# Patient Record
Sex: FEMALE | Race: ASIAN | NOT HISPANIC OR LATINO | URBAN - METROPOLITAN AREA
[De-identification: names, ages, dates, MRNs, and addresses within clinical notes are randomized per-mention and may not be internally consistent; named-entity substitution may affect disease eponyms.]

---

## 2021-06-10 ENCOUNTER — INPATIENT (INPATIENT)
Facility: HOSPITAL | Age: 61
LOS: 6 days | Discharge: HOME | DRG: 339 | End: 2021-06-17
Attending: SURGERY | Admitting: SURGERY
Payer: COMMERCIAL

## 2021-06-10 ENCOUNTER — RESULT REVIEW (OUTPATIENT)
Age: 61
End: 2021-06-10

## 2021-06-10 VITALS
OXYGEN SATURATION: 98 % | WEIGHT: 139.99 LBS | DIASTOLIC BLOOD PRESSURE: 81 MMHG | HEART RATE: 75 BPM | SYSTOLIC BLOOD PRESSURE: 144 MMHG | RESPIRATION RATE: 16 BRPM | TEMPERATURE: 99 F

## 2021-06-10 DIAGNOSIS — Z98.891 HISTORY OF UTERINE SCAR FROM PREVIOUS SURGERY: Chronic | ICD-10-CM

## 2021-06-10 DIAGNOSIS — K37 UNSPECIFIED APPENDICITIS: ICD-10-CM

## 2021-06-10 PROBLEM — Z00.00 ENCOUNTER FOR PREVENTIVE HEALTH EXAMINATION: Status: ACTIVE | Noted: 2021-06-10

## 2021-06-10 LAB
ALBUMIN SERPL ELPH-MCNC: 3 G/DL — LOW (ref 3.3–5)
ALP SERPL-CCNC: 209 U/L — HIGH (ref 40–120)
ALT FLD-CCNC: 111 U/L — HIGH (ref 12–78)
ANION GAP SERPL CALC-SCNC: 10 MMOL/L — SIGNIFICANT CHANGE UP (ref 5–17)
APPEARANCE UR: CLEAR — SIGNIFICANT CHANGE UP
APTT BLD: 35.2 SEC — SIGNIFICANT CHANGE UP (ref 27.5–35.5)
AST SERPL-CCNC: 39 U/L — HIGH (ref 15–37)
BASOPHILS # BLD AUTO: 0.03 K/UL — SIGNIFICANT CHANGE UP (ref 0–0.2)
BASOPHILS NFR BLD AUTO: 0.2 % — SIGNIFICANT CHANGE UP (ref 0–2)
BILIRUB SERPL-MCNC: 1.7 MG/DL — HIGH (ref 0.2–1.2)
BILIRUB UR-MCNC: ABNORMAL
BUN SERPL-MCNC: 9 MG/DL — SIGNIFICANT CHANGE UP (ref 7–23)
CALCIUM SERPL-MCNC: 8.5 MG/DL — SIGNIFICANT CHANGE UP (ref 8.5–10.1)
CHLORIDE SERPL-SCNC: 101 MMOL/L — SIGNIFICANT CHANGE UP (ref 96–108)
CO2 SERPL-SCNC: 24 MMOL/L — SIGNIFICANT CHANGE UP (ref 22–31)
COLOR SPEC: ABNORMAL
CREAT SERPL-MCNC: 0.69 MG/DL — SIGNIFICANT CHANGE UP (ref 0.5–1.3)
DIFF PNL FLD: ABNORMAL
EOSINOPHIL # BLD AUTO: 0.02 K/UL — SIGNIFICANT CHANGE UP (ref 0–0.5)
EOSINOPHIL NFR BLD AUTO: 0.1 % — SIGNIFICANT CHANGE UP (ref 0–6)
GLUCOSE SERPL-MCNC: 128 MG/DL — HIGH (ref 70–99)
GLUCOSE UR QL: NEGATIVE — SIGNIFICANT CHANGE UP
HCT VFR BLD CALC: 38.6 % — SIGNIFICANT CHANGE UP (ref 34.5–45)
HGB BLD-MCNC: 12.9 G/DL — SIGNIFICANT CHANGE UP (ref 11.5–15.5)
IMM GRANULOCYTES NFR BLD AUTO: 0.5 % — SIGNIFICANT CHANGE UP (ref 0–1.5)
INR BLD: 1.31 RATIO — HIGH (ref 0.88–1.16)
KETONES UR-MCNC: ABNORMAL
LEUKOCYTE ESTERASE UR-ACNC: ABNORMAL
LYMPHOCYTES # BLD AUTO: 0.78 K/UL — LOW (ref 1–3.3)
LYMPHOCYTES # BLD AUTO: 3.9 % — LOW (ref 13–44)
MCHC RBC-ENTMCNC: 32.6 PG — SIGNIFICANT CHANGE UP (ref 27–34)
MCHC RBC-ENTMCNC: 33.4 GM/DL — SIGNIFICANT CHANGE UP (ref 32–36)
MCV RBC AUTO: 97.5 FL — SIGNIFICANT CHANGE UP (ref 80–100)
MONOCYTES # BLD AUTO: 1.12 K/UL — HIGH (ref 0–0.9)
MONOCYTES NFR BLD AUTO: 5.7 % — SIGNIFICANT CHANGE UP (ref 2–14)
NEUTROPHILS # BLD AUTO: 17.7 K/UL — HIGH (ref 1.8–7.4)
NEUTROPHILS NFR BLD AUTO: 89.6 % — HIGH (ref 43–77)
NITRITE UR-MCNC: POSITIVE
NRBC # BLD: 0 /100 WBCS — SIGNIFICANT CHANGE UP (ref 0–0)
PH UR: 6 — SIGNIFICANT CHANGE UP (ref 5–8)
PLATELET # BLD AUTO: 337 K/UL — SIGNIFICANT CHANGE UP (ref 150–400)
POTASSIUM SERPL-MCNC: 3.6 MMOL/L — SIGNIFICANT CHANGE UP (ref 3.5–5.3)
POTASSIUM SERPL-SCNC: 3.6 MMOL/L — SIGNIFICANT CHANGE UP (ref 3.5–5.3)
PROT SERPL-MCNC: 8.3 G/DL — SIGNIFICANT CHANGE UP (ref 6–8.3)
PROT UR-MCNC: 30 MG/DL
PROTHROM AB SERPL-ACNC: 15.1 SEC — HIGH (ref 10.6–13.6)
RBC # BLD: 3.96 M/UL — SIGNIFICANT CHANGE UP (ref 3.8–5.2)
RBC # FLD: 12.2 % — SIGNIFICANT CHANGE UP (ref 10.3–14.5)
SARS-COV-2 RNA SPEC QL NAA+PROBE: SIGNIFICANT CHANGE UP
SODIUM SERPL-SCNC: 135 MMOL/L — SIGNIFICANT CHANGE UP (ref 135–145)
SP GR SPEC: 1.01 — SIGNIFICANT CHANGE UP (ref 1.01–1.02)
UROBILINOGEN FLD QL: 8
WBC # BLD: 19.75 K/UL — HIGH (ref 3.8–10.5)
WBC # FLD AUTO: 19.75 K/UL — HIGH (ref 3.8–10.5)

## 2021-06-10 PROCEDURE — 99285 EMERGENCY DEPT VISIT HI MDM: CPT

## 2021-06-10 PROCEDURE — 44970 LAPAROSCOPY APPENDECTOMY: CPT

## 2021-06-10 PROCEDURE — 44970 LAPAROSCOPY APPENDECTOMY: CPT | Mod: AS

## 2021-06-10 PROCEDURE — 88304 TISSUE EXAM BY PATHOLOGIST: CPT | Mod: 26

## 2021-06-10 PROCEDURE — 93010 ELECTROCARDIOGRAM REPORT: CPT

## 2021-06-10 RX ORDER — SODIUM CHLORIDE 9 MG/ML
1000 INJECTION, SOLUTION INTRAVENOUS
Refills: 0 | Status: DISCONTINUED | OUTPATIENT
Start: 2021-06-10 | End: 2021-06-10

## 2021-06-10 RX ORDER — SODIUM CHLORIDE 9 MG/ML
1000 INJECTION, SOLUTION INTRAVENOUS
Refills: 0 | Status: COMPLETED | OUTPATIENT
Start: 2021-06-10 | End: 2021-06-10

## 2021-06-10 RX ORDER — ENOXAPARIN SODIUM 100 MG/ML
40 INJECTION SUBCUTANEOUS DAILY
Refills: 0 | Status: DISCONTINUED | OUTPATIENT
Start: 2021-06-11 | End: 2021-06-17

## 2021-06-10 RX ORDER — PIPERACILLIN AND TAZOBACTAM 4; .5 G/20ML; G/20ML
3.38 INJECTION, POWDER, LYOPHILIZED, FOR SOLUTION INTRAVENOUS EVERY 8 HOURS
Refills: 0 | Status: COMPLETED | OUTPATIENT
Start: 2021-06-11 | End: 2021-06-15

## 2021-06-10 RX ORDER — ONDANSETRON 8 MG/1
4 TABLET, FILM COATED ORAL ONCE
Refills: 0 | Status: DISCONTINUED | OUTPATIENT
Start: 2021-06-10 | End: 2021-06-11

## 2021-06-10 RX ORDER — ACETAMINOPHEN 500 MG
650 TABLET ORAL ONCE
Refills: 0 | Status: COMPLETED | OUTPATIENT
Start: 2021-06-10 | End: 2021-06-10

## 2021-06-10 RX ORDER — HYDROMORPHONE HYDROCHLORIDE 2 MG/ML
0.5 INJECTION INTRAMUSCULAR; INTRAVENOUS; SUBCUTANEOUS ONCE
Refills: 0 | Status: DISCONTINUED | OUTPATIENT
Start: 2021-06-10 | End: 2021-06-10

## 2021-06-10 RX ORDER — HYDROMORPHONE HYDROCHLORIDE 2 MG/ML
0.5 INJECTION INTRAMUSCULAR; INTRAVENOUS; SUBCUTANEOUS EVERY 4 HOURS
Refills: 0 | Status: DISCONTINUED | OUTPATIENT
Start: 2021-06-11 | End: 2021-06-17

## 2021-06-10 RX ORDER — SODIUM CHLORIDE 9 MG/ML
1000 INJECTION INTRAMUSCULAR; INTRAVENOUS; SUBCUTANEOUS ONCE
Refills: 0 | Status: COMPLETED | OUTPATIENT
Start: 2021-06-10 | End: 2021-06-10

## 2021-06-10 RX ORDER — OXYCODONE HYDROCHLORIDE 5 MG/1
5 TABLET ORAL ONCE
Refills: 0 | Status: DISCONTINUED | OUTPATIENT
Start: 2021-06-10 | End: 2021-06-11

## 2021-06-10 RX ORDER — PANTOPRAZOLE SODIUM 20 MG/1
40 TABLET, DELAYED RELEASE ORAL DAILY
Refills: 0 | Status: DISCONTINUED | OUTPATIENT
Start: 2021-06-11 | End: 2021-06-17

## 2021-06-10 RX ORDER — HYDROMORPHONE HYDROCHLORIDE 2 MG/ML
0.5 INJECTION INTRAMUSCULAR; INTRAVENOUS; SUBCUTANEOUS
Refills: 0 | Status: DISCONTINUED | OUTPATIENT
Start: 2021-06-10 | End: 2021-06-11

## 2021-06-10 RX ORDER — ONDANSETRON 8 MG/1
4 TABLET, FILM COATED ORAL EVERY 6 HOURS
Refills: 0 | Status: DISCONTINUED | OUTPATIENT
Start: 2021-06-11 | End: 2021-06-17

## 2021-06-10 RX ORDER — HYDROMORPHONE HYDROCHLORIDE 2 MG/ML
1 INJECTION INTRAMUSCULAR; INTRAVENOUS; SUBCUTANEOUS EVERY 4 HOURS
Refills: 0 | Status: DISCONTINUED | OUTPATIENT
Start: 2021-06-11 | End: 2021-06-17

## 2021-06-10 RX ORDER — PIPERACILLIN AND TAZOBACTAM 4; .5 G/20ML; G/20ML
3.38 INJECTION, POWDER, LYOPHILIZED, FOR SOLUTION INTRAVENOUS ONCE
Refills: 0 | Status: COMPLETED | OUTPATIENT
Start: 2021-06-10 | End: 2021-06-10

## 2021-06-10 RX ORDER — SODIUM CHLORIDE 9 MG/ML
1000 INJECTION, SOLUTION INTRAVENOUS
Refills: 0 | Status: DISCONTINUED | OUTPATIENT
Start: 2021-06-11 | End: 2021-06-11

## 2021-06-10 RX ADMIN — SODIUM CHLORIDE 1000 MILLILITER(S): 9 INJECTION INTRAMUSCULAR; INTRAVENOUS; SUBCUTANEOUS at 18:06

## 2021-06-10 RX ADMIN — SODIUM CHLORIDE 1000 MILLILITER(S): 9 INJECTION INTRAMUSCULAR; INTRAVENOUS; SUBCUTANEOUS at 17:04

## 2021-06-10 RX ADMIN — SODIUM CHLORIDE 1000 MILLILITER(S): 9 INJECTION INTRAMUSCULAR; INTRAVENOUS; SUBCUTANEOUS at 17:06

## 2021-06-10 RX ADMIN — Medication 650 MILLIGRAM(S): at 19:27

## 2021-06-10 RX ADMIN — HYDROMORPHONE HYDROCHLORIDE 0.5 MILLIGRAM(S): 2 INJECTION INTRAMUSCULAR; INTRAVENOUS; SUBCUTANEOUS at 16:20

## 2021-06-10 RX ADMIN — PIPERACILLIN AND TAZOBACTAM 200 GRAM(S): 4; .5 INJECTION, POWDER, LYOPHILIZED, FOR SOLUTION INTRAVENOUS at 18:38

## 2021-06-10 RX ADMIN — HYDROMORPHONE HYDROCHLORIDE 0.5 MILLIGRAM(S): 2 INJECTION INTRAMUSCULAR; INTRAVENOUS; SUBCUTANEOUS at 23:17

## 2021-06-10 RX ADMIN — HYDROMORPHONE HYDROCHLORIDE 0.5 MILLIGRAM(S): 2 INJECTION INTRAMUSCULAR; INTRAVENOUS; SUBCUTANEOUS at 23:02

## 2021-06-10 RX ADMIN — SODIUM CHLORIDE 1000 MILLILITER(S): 9 INJECTION INTRAMUSCULAR; INTRAVENOUS; SUBCUTANEOUS at 16:00

## 2021-06-10 RX ADMIN — HYDROMORPHONE HYDROCHLORIDE 0.5 MILLIGRAM(S): 2 INJECTION INTRAMUSCULAR; INTRAVENOUS; SUBCUTANEOUS at 16:07

## 2021-06-10 RX ADMIN — SODIUM CHLORIDE 250 MILLILITER(S): 9 INJECTION, SOLUTION INTRAVENOUS at 22:42

## 2021-06-10 NOTE — ED PROVIDER NOTE - ATTENDING CONTRIBUTION TO CARE
60 yo female who presents to the ED with a cc of abdominal pain. Pt with no significant past medical history. Reports that for the last week she has been experiencing abdominal pain. Initially the pain began epigastric but has since migrated to the RLQ. Pt reports associated chills. Denies fever, N/V/D/C, CP, SOB, ext numbness or weakness. Pt reports that she followed with her PMD today and was sent for an outpatient CT scan which relieved acute appendicitis. Pt was sent to the ED for further work up. Last po intake 11 am. Pt has been fully vaccinated with Pfizer. On exam pt lying in bed appears uncomfortable, NCAT, PERRL, EOMI, heart RR, lungs CTA, abd soft with TTP to RLQ, suprapubic and LLQ with rebound and guarding +BS noted. Pt presenting with abdominal pain and reported appendicitis on outpatient imaging. Will obtain screening pre op labs, obtain outside report, consult with surgery, medicate for pain and monitor

## 2021-06-10 NOTE — ED PROVIDER NOTE - CLINICAL SUMMARY MEDICAL DECISION MAKING FREE TEXT BOX
presents today due to abdominal pain x 1 week. pt reports she saw Dr. Nicole in which had CT about an hour ago in which noted appendicitis. Reports Dr. Nicole discussed with Dr. Vela. pt admits to hematuria. plan includes labs, preop, admission

## 2021-06-10 NOTE — ED PROVIDER NOTE - OBJECTIVE STATEMENT
62 y/o female without reported PMHx presents today due to abdominal pain x 1 week. pt reports she saw Dr. Nicole in which had CT about an hour ago in which noted appendicitis. Reports Dr. Nicole discussed with Dr. Vela. pt describes pain as aching, non-radiating, and currently 8/10. pt admits to hematuria. pt denies fever, vomiting, dysuria, abdominal PSHx, or any other complaints.

## 2021-06-10 NOTE — H&P ADULT - HISTORY OF PRESENT ILLNESS
61 year old oriental female who presented to her GI doctor c/o severe abdominal pain. Pt states her pain started one week ago and was diffuse at first and later moved to the lower abdomen. She had temps, without nausea or vomiting. She denies any changes in her bowel habits..

## 2021-06-10 NOTE — PATIENT PROFILE ADULT - HAS THE PATIENT USED TOBACCO IN THE PAST 30 DAYS?
No
Pain not relieved by Medications/Numbness, color, or temperature change to extremity/Persistent Nausea and Vomiting/Unable to Urinate/Bleeding that does not stop/Swelling that continues/Fever greater than 101

## 2021-06-10 NOTE — ED ADULT NURSE NOTE - OBJECTIVE STATEMENT
Received the patient in the Er. Patient is alert and oriented. Skin warm AND DRY.  c/o abdominal pain. Abdomen soft and tender. Patient had CT scan out side. Patient will be going to OR. All jeweller and watch taken by .

## 2021-06-10 NOTE — ED PROVIDER NOTE - PHYSICAL EXAMINATION

## 2021-06-10 NOTE — ED ADULT NURSE NOTE - NSIMPLEMENTINTERV_GEN_ALL_ED
Implemented All Universal Safety Interventions:  Ronald to call system. Call bell, personal items and telephone within reach. Instruct patient to call for assistance. Room bathroom lighting operational. Non-slip footwear when patient is off stretcher. Physically safe environment: no spills, clutter or unnecessary equipment. Stretcher in lowest position, wheels locked, appropriate side rails in place.

## 2021-06-10 NOTE — BRIEF OPERATIVE NOTE - NSICDXBRIEFPOSTOP_GEN_ALL_CORE_FT
POST-OP DIAGNOSIS:  Perforated appendicitis 10-Virgil-2021 22:20:46 WITH GENERALIZED PERITONITIS, RLQ ABSCESS Austin Pritchard

## 2021-06-10 NOTE — H&P ADULT - ASSESSMENT
pt had an out pt ct scan of the abdomen and pelvis that was read as perforated appendicitis.  PLAN start IV antibiotics           to OR>

## 2021-06-10 NOTE — ED PROVIDER NOTE - PROGRESS NOTE DETAILS
Discussed with Dr. Vela, advised pre op labs. reports they are obtaining CT results. Advised they will provide abx in OR and to hold for now. Discussed with Dr. rTejo, accepting admission. Discussed results. Dr. Vela requested I apply Elion

## 2021-06-11 DIAGNOSIS — K37 UNSPECIFIED APPENDICITIS: ICD-10-CM

## 2021-06-11 LAB
ALBUMIN SERPL ELPH-MCNC: 2.1 G/DL — LOW (ref 3.3–5)
ALP SERPL-CCNC: 150 U/L — HIGH (ref 40–120)
ALT FLD-CCNC: 78 U/L — SIGNIFICANT CHANGE UP (ref 12–78)
ANION GAP SERPL CALC-SCNC: 8 MMOL/L — SIGNIFICANT CHANGE UP (ref 5–17)
AST SERPL-CCNC: 27 U/L — SIGNIFICANT CHANGE UP (ref 15–37)
BASOPHILS # BLD AUTO: 0.02 K/UL — SIGNIFICANT CHANGE UP (ref 0–0.2)
BASOPHILS NFR BLD AUTO: 0.1 % — SIGNIFICANT CHANGE UP (ref 0–2)
BILIRUB SERPL-MCNC: 2.1 MG/DL — HIGH (ref 0.2–1.2)
BUN SERPL-MCNC: 7 MG/DL — SIGNIFICANT CHANGE UP (ref 7–23)
CALCIUM SERPL-MCNC: 7.8 MG/DL — LOW (ref 8.5–10.1)
CHLORIDE SERPL-SCNC: 108 MMOL/L — SIGNIFICANT CHANGE UP (ref 96–108)
CO2 SERPL-SCNC: 24 MMOL/L — SIGNIFICANT CHANGE UP (ref 22–31)
COVID-19 SPIKE DOMAIN AB INTERP: POSITIVE
COVID-19 SPIKE DOMAIN ANTIBODY RESULT: 146 U/ML — HIGH
CREAT SERPL-MCNC: 0.53 MG/DL — SIGNIFICANT CHANGE UP (ref 0.5–1.3)
EOSINOPHIL # BLD AUTO: 0 K/UL — SIGNIFICANT CHANGE UP (ref 0–0.5)
EOSINOPHIL NFR BLD AUTO: 0 % — SIGNIFICANT CHANGE UP (ref 0–6)
GLUCOSE SERPL-MCNC: 148 MG/DL — HIGH (ref 70–99)
GRAM STN FLD: SIGNIFICANT CHANGE UP
HCT VFR BLD CALC: 32.4 % — LOW (ref 34.5–45)
HCV AB S/CO SERPL IA: 0.27 S/CO — SIGNIFICANT CHANGE UP (ref 0–0.99)
HCV AB SERPL-IMP: SIGNIFICANT CHANGE UP
HGB BLD-MCNC: 10.6 G/DL — LOW (ref 11.5–15.5)
IMM GRANULOCYTES NFR BLD AUTO: 0.6 % — SIGNIFICANT CHANGE UP (ref 0–1.5)
LYMPHOCYTES # BLD AUTO: 0.61 K/UL — LOW (ref 1–3.3)
LYMPHOCYTES # BLD AUTO: 3.7 % — LOW (ref 13–44)
MCHC RBC-ENTMCNC: 32.1 PG — SIGNIFICANT CHANGE UP (ref 27–34)
MCHC RBC-ENTMCNC: 32.7 GM/DL — SIGNIFICANT CHANGE UP (ref 32–36)
MCV RBC AUTO: 98.2 FL — SIGNIFICANT CHANGE UP (ref 80–100)
MONOCYTES # BLD AUTO: 0.42 K/UL — SIGNIFICANT CHANGE UP (ref 0–0.9)
MONOCYTES NFR BLD AUTO: 2.6 % — SIGNIFICANT CHANGE UP (ref 2–14)
NEUTROPHILS # BLD AUTO: 15.19 K/UL — HIGH (ref 1.8–7.4)
NEUTROPHILS NFR BLD AUTO: 93 % — HIGH (ref 43–77)
NRBC # BLD: 0 /100 WBCS — SIGNIFICANT CHANGE UP (ref 0–0)
PLATELET # BLD AUTO: 271 K/UL — SIGNIFICANT CHANGE UP (ref 150–400)
POTASSIUM SERPL-MCNC: 3.9 MMOL/L — SIGNIFICANT CHANGE UP (ref 3.5–5.3)
POTASSIUM SERPL-SCNC: 3.9 MMOL/L — SIGNIFICANT CHANGE UP (ref 3.5–5.3)
PROT SERPL-MCNC: 6.4 G/DL — SIGNIFICANT CHANGE UP (ref 6–8.3)
RBC # BLD: 3.3 M/UL — LOW (ref 3.8–5.2)
RBC # FLD: 12.6 % — SIGNIFICANT CHANGE UP (ref 10.3–14.5)
SARS-COV-2 IGG+IGM SERPL QL IA: 146 U/ML — HIGH
SARS-COV-2 IGG+IGM SERPL QL IA: POSITIVE
SODIUM SERPL-SCNC: 140 MMOL/L — SIGNIFICANT CHANGE UP (ref 135–145)
SPECIMEN SOURCE: SIGNIFICANT CHANGE UP
WBC # BLD: 16.33 K/UL — HIGH (ref 3.8–10.5)
WBC # FLD AUTO: 16.33 K/UL — HIGH (ref 3.8–10.5)

## 2021-06-11 RX ORDER — SODIUM CHLORIDE 9 MG/ML
1000 INJECTION INTRAMUSCULAR; INTRAVENOUS; SUBCUTANEOUS
Refills: 0 | Status: DISCONTINUED | OUTPATIENT
Start: 2021-06-11 | End: 2021-06-13

## 2021-06-11 RX ORDER — ACETAMINOPHEN 500 MG
1000 TABLET ORAL ONCE
Refills: 0 | Status: COMPLETED | OUTPATIENT
Start: 2021-06-11 | End: 2021-06-11

## 2021-06-11 RX ORDER — ACETAMINOPHEN 500 MG
650 TABLET ORAL ONCE
Refills: 0 | Status: COMPLETED | OUTPATIENT
Start: 2021-06-11 | End: 2021-06-11

## 2021-06-11 RX ORDER — OMEPRAZOLE 10 MG/1
1 CAPSULE, DELAYED RELEASE ORAL
Qty: 0 | Refills: 0 | DISCHARGE

## 2021-06-11 RX ADMIN — Medication 1000 MILLIGRAM(S): at 05:38

## 2021-06-11 RX ADMIN — ENOXAPARIN SODIUM 40 MILLIGRAM(S): 100 INJECTION SUBCUTANEOUS at 11:21

## 2021-06-11 RX ADMIN — SODIUM CHLORIDE 75 MILLILITER(S): 9 INJECTION INTRAMUSCULAR; INTRAVENOUS; SUBCUTANEOUS at 16:12

## 2021-06-11 RX ADMIN — PIPERACILLIN AND TAZOBACTAM 25 GRAM(S): 4; .5 INJECTION, POWDER, LYOPHILIZED, FOR SOLUTION INTRAVENOUS at 02:24

## 2021-06-11 RX ADMIN — Medication 650 MILLIGRAM(S): at 17:56

## 2021-06-11 RX ADMIN — PIPERACILLIN AND TAZOBACTAM 25 GRAM(S): 4; .5 INJECTION, POWDER, LYOPHILIZED, FOR SOLUTION INTRAVENOUS at 17:49

## 2021-06-11 RX ADMIN — Medication 400 MILLIGRAM(S): at 05:01

## 2021-06-11 RX ADMIN — PIPERACILLIN AND TAZOBACTAM 25 GRAM(S): 4; .5 INJECTION, POWDER, LYOPHILIZED, FOR SOLUTION INTRAVENOUS at 11:21

## 2021-06-11 RX ADMIN — Medication 650 MILLIGRAM(S): at 18:59

## 2021-06-11 RX ADMIN — PANTOPRAZOLE SODIUM 40 MILLIGRAM(S): 20 TABLET, DELAYED RELEASE ORAL at 11:21

## 2021-06-11 NOTE — PROGRESS NOTE ADULT - ASSESSMENT
IMPRESSION pt improving  PLAN d/c NGT and start on clears           d/c carrillo           encourage OOB

## 2021-06-12 ENCOUNTER — TRANSCRIPTION ENCOUNTER (OUTPATIENT)
Age: 61
End: 2021-06-12

## 2021-06-12 LAB
-  AMIKACIN: SIGNIFICANT CHANGE UP
-  AMOXICILLIN/CLAVULANIC ACID: SIGNIFICANT CHANGE UP
-  AMPICILLIN/SULBACTAM: SIGNIFICANT CHANGE UP
-  AMPICILLIN: SIGNIFICANT CHANGE UP
-  AZTREONAM: SIGNIFICANT CHANGE UP
-  CEFAZOLIN: SIGNIFICANT CHANGE UP
-  CEFEPIME: SIGNIFICANT CHANGE UP
-  CEFOXITIN: SIGNIFICANT CHANGE UP
-  CEFTRIAXONE: SIGNIFICANT CHANGE UP
-  CIPROFLOXACIN: SIGNIFICANT CHANGE UP
-  ERTAPENEM: SIGNIFICANT CHANGE UP
-  GENTAMICIN: SIGNIFICANT CHANGE UP
-  IMIPENEM: SIGNIFICANT CHANGE UP
-  LEVOFLOXACIN: SIGNIFICANT CHANGE UP
-  MEROPENEM: SIGNIFICANT CHANGE UP
-  PIPERACILLIN/TAZOBACTAM: SIGNIFICANT CHANGE UP
-  TOBRAMYCIN: SIGNIFICANT CHANGE UP
-  TRIMETHOPRIM/SULFAMETHOXAZOLE: SIGNIFICANT CHANGE UP
ANION GAP SERPL CALC-SCNC: 7 MMOL/L — SIGNIFICANT CHANGE UP (ref 5–17)
BUN SERPL-MCNC: 11 MG/DL — SIGNIFICANT CHANGE UP (ref 7–23)
CALCIUM SERPL-MCNC: 8.1 MG/DL — LOW (ref 8.5–10.1)
CHLORIDE SERPL-SCNC: 109 MMOL/L — HIGH (ref 96–108)
CO2 SERPL-SCNC: 26 MMOL/L — SIGNIFICANT CHANGE UP (ref 22–31)
CREAT SERPL-MCNC: 0.61 MG/DL — SIGNIFICANT CHANGE UP (ref 0.5–1.3)
GLUCOSE SERPL-MCNC: 160 MG/DL — HIGH (ref 70–99)
HCT VFR BLD CALC: 37.2 % — SIGNIFICANT CHANGE UP (ref 34.5–45)
HGB BLD-MCNC: 12.3 G/DL — SIGNIFICANT CHANGE UP (ref 11.5–15.5)
MCHC RBC-ENTMCNC: 31.9 PG — SIGNIFICANT CHANGE UP (ref 27–34)
MCHC RBC-ENTMCNC: 33.1 GM/DL — SIGNIFICANT CHANGE UP (ref 32–36)
MCV RBC AUTO: 96.6 FL — SIGNIFICANT CHANGE UP (ref 80–100)
METHOD TYPE: SIGNIFICANT CHANGE UP
NRBC # BLD: 0 /100 WBCS — SIGNIFICANT CHANGE UP (ref 0–0)
PLATELET # BLD AUTO: 391 K/UL — SIGNIFICANT CHANGE UP (ref 150–400)
POTASSIUM SERPL-MCNC: 3.5 MMOL/L — SIGNIFICANT CHANGE UP (ref 3.5–5.3)
POTASSIUM SERPL-SCNC: 3.5 MMOL/L — SIGNIFICANT CHANGE UP (ref 3.5–5.3)
RBC # BLD: 3.85 M/UL — SIGNIFICANT CHANGE UP (ref 3.8–5.2)
RBC # FLD: 12.8 % — SIGNIFICANT CHANGE UP (ref 10.3–14.5)
SODIUM SERPL-SCNC: 142 MMOL/L — SIGNIFICANT CHANGE UP (ref 135–145)
WBC # BLD: 20.61 K/UL — HIGH (ref 3.8–10.5)
WBC # FLD AUTO: 20.61 K/UL — HIGH (ref 3.8–10.5)

## 2021-06-12 PROCEDURE — 99222 1ST HOSP IP/OBS MODERATE 55: CPT

## 2021-06-12 RX ADMIN — PANTOPRAZOLE SODIUM 40 MILLIGRAM(S): 20 TABLET, DELAYED RELEASE ORAL at 11:11

## 2021-06-12 RX ADMIN — HYDROMORPHONE HYDROCHLORIDE 1 MILLIGRAM(S): 2 INJECTION INTRAMUSCULAR; INTRAVENOUS; SUBCUTANEOUS at 23:00

## 2021-06-12 RX ADMIN — PIPERACILLIN AND TAZOBACTAM 25 GRAM(S): 4; .5 INJECTION, POWDER, LYOPHILIZED, FOR SOLUTION INTRAVENOUS at 18:12

## 2021-06-12 RX ADMIN — PIPERACILLIN AND TAZOBACTAM 25 GRAM(S): 4; .5 INJECTION, POWDER, LYOPHILIZED, FOR SOLUTION INTRAVENOUS at 03:18

## 2021-06-12 RX ADMIN — HYDROMORPHONE HYDROCHLORIDE 1 MILLIGRAM(S): 2 INJECTION INTRAMUSCULAR; INTRAVENOUS; SUBCUTANEOUS at 22:38

## 2021-06-12 RX ADMIN — SODIUM CHLORIDE 75 MILLILITER(S): 9 INJECTION INTRAMUSCULAR; INTRAVENOUS; SUBCUTANEOUS at 20:00

## 2021-06-12 RX ADMIN — ENOXAPARIN SODIUM 40 MILLIGRAM(S): 100 INJECTION SUBCUTANEOUS at 11:11

## 2021-06-12 RX ADMIN — PIPERACILLIN AND TAZOBACTAM 25 GRAM(S): 4; .5 INJECTION, POWDER, LYOPHILIZED, FOR SOLUTION INTRAVENOUS at 10:23

## 2021-06-12 NOTE — DISCHARGE NOTE PROVIDER - NSDCCPCAREPLAN_GEN_ALL_CORE_FT
PRINCIPAL DISCHARGE DIAGNOSIS  Diagnosis: Appendicitis  Assessment and Plan of Treatment:       SECONDARY DISCHARGE DIAGNOSES  Diagnosis: Liver function abnormality  Assessment and Plan of Treatment:

## 2021-06-12 NOTE — DISCHARGE NOTE PROVIDER - HOSPITAL COURSE
61 year old oriental female who presented to her GI doctor c/o severe abdominal pain. Pt states her pain started one week ago and was diffuse at first and later moved to the lower abdomen. She had temps, without nausea or vomiting. She denies any changes in her bowel habits. CT revealed perforated appendicitis. Pt was made NPO and scheduled for OR.    Hospital course:    Patient underwent successful laparoscopic appendectomy without complications, appendix noted to be perforated, was sent to PACU then to the floor for monitoring.      Over then next few days patient was continued on antibiotics, given pain control. ID consulted POD 2 for increase in white count. Diet was advanced appropriately until return of normal GI function was obtained as evidence by passing of flatus and BM in addition to toleration of diet. Lab values were monitored.    Disposition: Patient to follow-up with Dr. Vela as outpatient in 1 week.           61 year old oriental female who presented to her GI doctor c/o severe abdominal pain. Pt states her pain started one week ago and was diffuse at first and later moved to the lower abdomen. She had temps, without nausea or vomiting. She denies any changes in her bowel habits. CT revealed perforated appendicitis. Pt was made NPO and scheduled for OR.    Hospital course:    Patient underwent successful laparoscopic appendectomy without complications, appendix noted to be perforated, was sent to PACU then to the floor for monitoring.      Over then next few days patient was continued on antibiotics, given pain control. ID consulted POD 2 for increase in white count. Diet was advanced appropriately until return of normal GI function was obtained as evidence by passing of flatus and BM in addition to toleration of diet. Lab values were monitored. Pt had a persistently elevated WBC and therefore a  repeat ct scan was done. It showed small fluid collections  without any discrete abscess. Pt is being sent home on oral antibiotics and plans to repeat the ct scan as an out pt.    Disposition: Patient to follow-up with Dr. Vela as outpatient in 1 week.

## 2021-06-12 NOTE — DISCHARGE NOTE PROVIDER - NSDCMRMEDTOKEN_GEN_ALL_CORE_FT
Motrin  mg oral tablet: 1 tab(s) orally once a day, As Needed  omeprazole 40 mg oral delayed release capsule: 1 cap(s) orally once a day   cefpodoxime 200 mg oral tablet: 1 tab(s) orally every 12 hours  metroNIDAZOLE 500 mg oral tablet: 1 tab(s) orally every 8 hours  omeprazole 40 mg oral delayed release capsule: 1 cap(s) orally once a day

## 2021-06-12 NOTE — CONSULT NOTE ADULT - SUBJECTIVE AND OBJECTIVE BOX
[facility]  INFECTIOUS DISEASES   [address] [place] [address]  Tel: [phone]     Fax: [phone]  =======================================================    MRN-[id number]  EDD BUSH     CC: abdominal pain     HPI:  62 yo woman with no significant PMH was admitted with abdominal pain and had fever, nausea and vomiting but no diarrhea. Reportedly imaginag showed perforated Appendicitis with RLQ abscess, so went to OR on 6/10 night and had appendectomy.  Now has a drain with high serosanguinous out put. No fever.     PAST MEDICAL & SURGICAL HISTORY:  No pertinent past medical history  H/O  section    Social Hx: no smoking, ETOH or drugs     FAMILY HISTORY: noncontributory     Allergies  No Known Allergies    Antibiotics:  piperacillin/tazobactam IVPB.. 3.375 Gram(s) IV Intermittent every 8 hours    REVIEW OF SYSTEMS:  CONSTITUTIONAL:  No Fever or chills  HEENT:  No diplopia or blurred vision.  No sore throat or runny nose.  CARDIOVASCULAR:  No chest pain or SOB.  RESPIRATORY:  No cough, shortness of breath, PND or orthopnea.  GASTROINTESTINAL:  No nausea, vomiting or diarrhea. + abdominal pain and bloating   GENITOURINARY:  No dysuria, frequency or urgency. No Blood in urine  MUSCULOSKELETAL:  no joint aches, no muscle pain  SKIN:  No change in skin, hair or nails.  NEUROLOGIC:  No paresthesias, fasciculations, seizures or weakness.  PSYCHIATRIC:  No disorder of thought or mood.  ENDOCRINE:  No heat or cold intolerance, polyuria or polydipsia.  HEMATOLOGICAL:  No easy bruising or bleeding.     Physical Exam:  Vital Signs Last 24 Hrs  T(C): 36.5 (2021 05:43), Max: 36.9 (2021 20:32)  T(F): 97.7 (2021 05:43), Max: 98.4 (2021 20:32)  HR: 74 (2021 05:43) (72 - 75)  BP: 157/82 (2021 05:43) (130/81 - 157/82)  BP(mean): --  RR: 18 (2021 05:43) (18 - 18)  SpO2: 95% (2021 05:43) (95% - 97%)  GEN: NAD  HEENT: normocephalic and atraumatic. EOMI. PERRL.    NECK: Supple.  No lymphadenopathy   LUNGS: Clear to auscultation.  HEART: Regular rate and rhythm without murmur.  ABDOMEN: Soft, tender, dressing on surgical site, drain with serosanguinous fluid   : No CVA tenderness  EXTREMITIES: Without any cyanosis, clubbing, rash, lesions or edema.  NEUROLOGIC: grossly intact.  PSYCHIATRIC: Appropriate affect .  SKIN: No ulceration or induration present.    Labs:      142  |  109<H>  |  11  ----------------------------<  160<H>  3.5   |  26  |  0.61    Ca    8.1<L>      2021 07:34    TPro  6.4  /  Alb  2.1<L>  /  TBili  2.1<H>  /  DBili  x   /  AST  27  /  ALT  78  /  AlkPhos  150<H>                          12.3   20.61 )-----------( 391      ( 2021 07:34 )             37.2     PT/INR - ( 10 Virgil 2021 15:59 )   PT: 15.1 sec;   INR: 1.31 ratio    PTT - ( 10 Virgil 2021 15:59 )  PTT:35.2 sec  Urinalysis Basic - ( 10 Virgil 2021 17:10 )    Color: Valentina / Appearance: Clear / S.015 / pH: x  Gluc: x / Ketone: Small  / Bili: Moderate / Urobili: 8   Blood: x / Protein: 30 mg/dL / Nitrite: Positive   Leuk Esterase: Trace / RBC: 3-5 /HPF / WBC 3-5   Sq Epi: x / Non Sq Epi: Occasional / Bacteria: Few    LIVER FUNCTIONS - ( 2021 07:04 )  Alb: 2.1 g/dL / Pro: 6.4 g/dL / ALK PHOS: 150 U/L / ALT: 78 U/L / AST: 27 U/L / GGT: x           COVID-19 PCR: NotDetec (06-10-21 @ 16:00)    RECENT CULTURES:   @ 00:27 .Body Fluid Peritoneal Fluid     Few Escherichia coli    polymorphonuclear leukocytes seen  No organisms seen  by cytocentrifuge    All imaging and other data have been reviewed.    Assessment and Plan:   62 yo woman with no significant PMH was admitted with abdominal pain and had fever, nausea and vomiting but no diarrhea. Reportedly imaginag showed perforated Appendicitis with RLQ abscess, so went to OR on 6/10 night and had appendectomy.  No fever but leukocytosis is 20k which could be normal reaction after surgery.   Abscess culture with Ecoli which is expected, the most common bacteria in GI tract.     1- perforated appendicitis  - Will follow fluid culture sensitivity for ecoli  - For now Zosyn 3.375gm q8h is a very appropriate antibiotic to cover GI bob.   - Will follow WBC if higher will need more work up including blood culture and repeat abdominal CT  - Advance diet as per surgery.     Thank you for courtesy of this consult.     Will follow.    Emelyn Benedict MD  Division of Infectious Diseases   Cell [phone] between 8am and 6pm   After 6pm and weekends please call ID service at [phone].

## 2021-06-12 NOTE — PROGRESS NOTE ADULT - ASSESSMENT
61y Female s/p laparoscopic appendectomy for perforated appendicitis with abscess and generalized peritonitis.  61y Female s/p laparoscopic appendectomy for perforated appendicitis with abscess and generalized peritonitis.     WBC increasting.  Pt appears to be developing and ileus.  Abdomen distended.  Will maintain ice chips.

## 2021-06-12 NOTE — DISCHARGE NOTE PROVIDER - CARE PROVIDER_API CALL
Kolby Vela)  Surgery  [address] [place] [phone]  Phone: [phone]  Fax: [phone]  Follow Up Time:

## 2021-06-13 LAB
ANION GAP SERPL CALC-SCNC: 8 MMOL/L — SIGNIFICANT CHANGE UP (ref 5–17)
BASOPHILS # BLD AUTO: 0.03 K/UL — SIGNIFICANT CHANGE UP (ref 0–0.2)
BASOPHILS NFR BLD AUTO: 0.2 % — SIGNIFICANT CHANGE UP (ref 0–2)
BUN SERPL-MCNC: 11 MG/DL — SIGNIFICANT CHANGE UP (ref 7–23)
CALCIUM SERPL-MCNC: 8.1 MG/DL — LOW (ref 8.5–10.1)
CHLORIDE SERPL-SCNC: 109 MMOL/L — HIGH (ref 96–108)
CO2 SERPL-SCNC: 25 MMOL/L — SIGNIFICANT CHANGE UP (ref 22–31)
CREAT SERPL-MCNC: 0.51 MG/DL — SIGNIFICANT CHANGE UP (ref 0.5–1.3)
EOSINOPHIL # BLD AUTO: 0.06 K/UL — SIGNIFICANT CHANGE UP (ref 0–0.5)
EOSINOPHIL NFR BLD AUTO: 0.4 % — SIGNIFICANT CHANGE UP (ref 0–6)
GLUCOSE SERPL-MCNC: 94 MG/DL — SIGNIFICANT CHANGE UP (ref 70–99)
HCT VFR BLD CALC: 33.5 % — LOW (ref 34.5–45)
HGB BLD-MCNC: 11 G/DL — LOW (ref 11.5–15.5)
IMM GRANULOCYTES NFR BLD AUTO: 0.5 % — SIGNIFICANT CHANGE UP (ref 0–1.5)
LYMPHOCYTES # BLD AUTO: 1.34 K/UL — SIGNIFICANT CHANGE UP (ref 1–3.3)
LYMPHOCYTES # BLD AUTO: 8 % — LOW (ref 13–44)
MCHC RBC-ENTMCNC: 32 PG — SIGNIFICANT CHANGE UP (ref 27–34)
MCHC RBC-ENTMCNC: 32.8 GM/DL — SIGNIFICANT CHANGE UP (ref 32–36)
MCV RBC AUTO: 97.4 FL — SIGNIFICANT CHANGE UP (ref 80–100)
MONOCYTES # BLD AUTO: 0.89 K/UL — SIGNIFICANT CHANGE UP (ref 0–0.9)
MONOCYTES NFR BLD AUTO: 5.3 % — SIGNIFICANT CHANGE UP (ref 2–14)
NEUTROPHILS # BLD AUTO: 14.34 K/UL — HIGH (ref 1.8–7.4)
NEUTROPHILS NFR BLD AUTO: 85.6 % — HIGH (ref 43–77)
NRBC # BLD: 0 /100 WBCS — SIGNIFICANT CHANGE UP (ref 0–0)
PLATELET # BLD AUTO: 405 K/UL — HIGH (ref 150–400)
POTASSIUM SERPL-MCNC: 3.5 MMOL/L — SIGNIFICANT CHANGE UP (ref 3.5–5.3)
POTASSIUM SERPL-SCNC: 3.5 MMOL/L — SIGNIFICANT CHANGE UP (ref 3.5–5.3)
RBC # BLD: 3.44 M/UL — LOW (ref 3.8–5.2)
RBC # FLD: 12.9 % — SIGNIFICANT CHANGE UP (ref 10.3–14.5)
SODIUM SERPL-SCNC: 142 MMOL/L — SIGNIFICANT CHANGE UP (ref 135–145)
WBC # BLD: 16.75 K/UL — HIGH (ref 3.8–10.5)
WBC # FLD AUTO: 16.75 K/UL — HIGH (ref 3.8–10.5)

## 2021-06-13 PROCEDURE — 99232 SBSQ HOSP IP/OBS MODERATE 35: CPT

## 2021-06-13 RX ORDER — ACETAMINOPHEN 500 MG
1000 TABLET ORAL EVERY 6 HOURS
Refills: 0 | Status: DISCONTINUED | OUTPATIENT
Start: 2021-06-13 | End: 2021-06-17

## 2021-06-13 RX ORDER — SODIUM CHLORIDE 9 MG/ML
1000 INJECTION INTRAMUSCULAR; INTRAVENOUS; SUBCUTANEOUS
Refills: 0 | Status: DISCONTINUED | OUTPATIENT
Start: 2021-06-13 | End: 2021-06-14

## 2021-06-13 RX ORDER — SENNA PLUS 8.6 MG/1
2 TABLET ORAL AT BEDTIME
Refills: 0 | Status: DISCONTINUED | OUTPATIENT
Start: 2021-06-13 | End: 2021-06-14

## 2021-06-13 RX ORDER — IBUPROFEN 200 MG
600 TABLET ORAL EVERY 8 HOURS
Refills: 0 | Status: DISCONTINUED | OUTPATIENT
Start: 2021-06-13 | End: 2021-06-17

## 2021-06-13 RX ADMIN — HYDROMORPHONE HYDROCHLORIDE 1 MILLIGRAM(S): 2 INJECTION INTRAMUSCULAR; INTRAVENOUS; SUBCUTANEOUS at 21:16

## 2021-06-13 RX ADMIN — Medication 30 MILLILITER(S): at 16:47

## 2021-06-13 RX ADMIN — Medication 30 MILLILITER(S): at 02:19

## 2021-06-13 RX ADMIN — Medication 1000 MILLIGRAM(S): at 23:46

## 2021-06-13 RX ADMIN — Medication 1000 MILLIGRAM(S): at 09:58

## 2021-06-13 RX ADMIN — PIPERACILLIN AND TAZOBACTAM 25 GRAM(S): 4; .5 INJECTION, POWDER, LYOPHILIZED, FOR SOLUTION INTRAVENOUS at 17:39

## 2021-06-13 RX ADMIN — ENOXAPARIN SODIUM 40 MILLIGRAM(S): 100 INJECTION SUBCUTANEOUS at 11:17

## 2021-06-13 RX ADMIN — Medication 1000 MILLIGRAM(S): at 17:39

## 2021-06-13 RX ADMIN — HYDROMORPHONE HYDROCHLORIDE 0.5 MILLIGRAM(S): 2 INJECTION INTRAMUSCULAR; INTRAVENOUS; SUBCUTANEOUS at 18:26

## 2021-06-13 RX ADMIN — HYDROMORPHONE HYDROCHLORIDE 0.5 MILLIGRAM(S): 2 INJECTION INTRAMUSCULAR; INTRAVENOUS; SUBCUTANEOUS at 18:41

## 2021-06-13 RX ADMIN — Medication 1000 MILLIGRAM(S): at 18:39

## 2021-06-13 RX ADMIN — Medication 1000 MILLIGRAM(S): at 23:01

## 2021-06-13 RX ADMIN — PIPERACILLIN AND TAZOBACTAM 25 GRAM(S): 4; .5 INJECTION, POWDER, LYOPHILIZED, FOR SOLUTION INTRAVENOUS at 02:21

## 2021-06-13 RX ADMIN — PIPERACILLIN AND TAZOBACTAM 25 GRAM(S): 4; .5 INJECTION, POWDER, LYOPHILIZED, FOR SOLUTION INTRAVENOUS at 09:57

## 2021-06-13 RX ADMIN — PANTOPRAZOLE SODIUM 40 MILLIGRAM(S): 20 TABLET, DELAYED RELEASE ORAL at 11:17

## 2021-06-13 RX ADMIN — HYDROMORPHONE HYDROCHLORIDE 0.5 MILLIGRAM(S): 2 INJECTION INTRAMUSCULAR; INTRAVENOUS; SUBCUTANEOUS at 02:19

## 2021-06-13 RX ADMIN — HYDROMORPHONE HYDROCHLORIDE 0.5 MILLIGRAM(S): 2 INJECTION INTRAMUSCULAR; INTRAVENOUS; SUBCUTANEOUS at 03:00

## 2021-06-13 RX ADMIN — Medication 1000 MILLIGRAM(S): at 10:58

## 2021-06-13 RX ADMIN — HYDROMORPHONE HYDROCHLORIDE 1 MILLIGRAM(S): 2 INJECTION INTRAMUSCULAR; INTRAVENOUS; SUBCUTANEOUS at 21:46

## 2021-06-13 NOTE — DIETITIAN INITIAL EVALUATION ADULT. - ORAL INTAKE PTA/DIET HISTORY
Pt reports good and normal appetite and PO intake PTA. Denies following any dietary restrictions PTA. Supplement use PTA includes MVI. NKFA.

## 2021-06-13 NOTE — DIETITIAN INITIAL EVALUATION ADULT. - ADD RECOMMEND
1) When medically feasible recommend to advance diet Full liquid then to Regular diet, 2) Pt encouraged adequate PO intake, encouraged to consume Ensure Clear while remains on Clear liquid diet as it provides source of protein and nutrients, 3) Monitor pt's PO intake, weight, skin, edema, GI distress

## 2021-06-13 NOTE — DIETITIAN INITIAL EVALUATION ADULT. - OTHER INFO
As per chart pt is a 61 year old female with no significant PMH admitted with appendicitis, POD 3 s/p (6/10) lap appy with PIPE drain placement for perforated appendicitis.     Pt seen at bedside. Pt is currently on Clear liquid diet, reports that she is tolerating it well however needs to consume it very slowly. When encouraging to consume Ensure Clear pt states that it caused her to feel bloated the other day and thinks she drank it too fast. Pt made aware it provides a source of protein and nutrients and encouraged to take small sips of it throughout the day. Pt's admission weight 140lbs. current weights per chart (6/11) 163.5lbs, (6/12) 162lbs, (6/13) 162lbs ? accuracy of these weights likely bedscale error. Pt reports current weight and UBW of 140lbs,s stable, denies any significant recent weight changes. Denies any chewing/ swallowing difficulties. Denies any nausea/ vomiting today, still no BM.     No pressure injuries noted at this time

## 2021-06-13 NOTE — PROGRESS NOTE ADULT - ASSESSMENT
61y Female POD 2 s/p lap appy with PIPE drain placement for perforated appendicitis.  61y Female POD 2 s/p lap appy with PIPE drain placement for perforated appendicitis.     Pt seen and examined with HUMPHREY Winchester, clinically looks better and feels better today,  WBC trending downward, continued post op ileus.

## 2021-06-14 LAB
-  CEFTRIAXONE: SIGNIFICANT CHANGE UP
-  CLINDAMYCIN: SIGNIFICANT CHANGE UP
-  ERYTHROMYCIN: SIGNIFICANT CHANGE UP
-  LEVOFLOXACIN: SIGNIFICANT CHANGE UP
-  PENICILLIN: SIGNIFICANT CHANGE UP
-  VANCOMYCIN: SIGNIFICANT CHANGE UP
ANION GAP SERPL CALC-SCNC: 9 MMOL/L — SIGNIFICANT CHANGE UP (ref 5–17)
BASOPHILS # BLD AUTO: 0.03 K/UL — SIGNIFICANT CHANGE UP (ref 0–0.2)
BASOPHILS NFR BLD AUTO: 0.2 % — SIGNIFICANT CHANGE UP (ref 0–2)
BUN SERPL-MCNC: 9 MG/DL — SIGNIFICANT CHANGE UP (ref 7–23)
CALCIUM SERPL-MCNC: 7.8 MG/DL — LOW (ref 8.5–10.1)
CHLORIDE SERPL-SCNC: 105 MMOL/L — SIGNIFICANT CHANGE UP (ref 96–108)
CO2 SERPL-SCNC: 26 MMOL/L — SIGNIFICANT CHANGE UP (ref 22–31)
CREAT SERPL-MCNC: 0.45 MG/DL — LOW (ref 0.5–1.3)
EOSINOPHIL # BLD AUTO: 0.1 K/UL — SIGNIFICANT CHANGE UP (ref 0–0.5)
EOSINOPHIL NFR BLD AUTO: 0.8 % — SIGNIFICANT CHANGE UP (ref 0–6)
GLUCOSE SERPL-MCNC: 104 MG/DL — HIGH (ref 70–99)
HCT VFR BLD CALC: 29.6 % — LOW (ref 34.5–45)
HGB BLD-MCNC: 10 G/DL — LOW (ref 11.5–15.5)
IMM GRANULOCYTES NFR BLD AUTO: 0.8 % — SIGNIFICANT CHANGE UP (ref 0–1.5)
LYMPHOCYTES # BLD AUTO: 1.35 K/UL — SIGNIFICANT CHANGE UP (ref 1–3.3)
LYMPHOCYTES # BLD AUTO: 10.3 % — LOW (ref 13–44)
MCHC RBC-ENTMCNC: 32.6 PG — SIGNIFICANT CHANGE UP (ref 27–34)
MCHC RBC-ENTMCNC: 33.8 GM/DL — SIGNIFICANT CHANGE UP (ref 32–36)
MCV RBC AUTO: 96.4 FL — SIGNIFICANT CHANGE UP (ref 80–100)
METHOD TYPE: SIGNIFICANT CHANGE UP
METHOD TYPE: SIGNIFICANT CHANGE UP
MONOCYTES # BLD AUTO: 0.8 K/UL — SIGNIFICANT CHANGE UP (ref 0–0.9)
MONOCYTES NFR BLD AUTO: 6.1 % — SIGNIFICANT CHANGE UP (ref 2–14)
NEUTROPHILS # BLD AUTO: 10.79 K/UL — HIGH (ref 1.8–7.4)
NEUTROPHILS NFR BLD AUTO: 81.8 % — HIGH (ref 43–77)
NRBC # BLD: 0 /100 WBCS — SIGNIFICANT CHANGE UP (ref 0–0)
PLATELET # BLD AUTO: 364 K/UL — SIGNIFICANT CHANGE UP (ref 150–400)
POTASSIUM SERPL-MCNC: 3.1 MMOL/L — LOW (ref 3.5–5.3)
POTASSIUM SERPL-SCNC: 3.1 MMOL/L — LOW (ref 3.5–5.3)
RBC # BLD: 3.07 M/UL — LOW (ref 3.8–5.2)
RBC # FLD: 13.1 % — SIGNIFICANT CHANGE UP (ref 10.3–14.5)
SODIUM SERPL-SCNC: 140 MMOL/L — SIGNIFICANT CHANGE UP (ref 135–145)
SURGICAL PATHOLOGY STUDY: SIGNIFICANT CHANGE UP
WBC # BLD: 13.17 K/UL — HIGH (ref 3.8–10.5)
WBC # FLD AUTO: 13.17 K/UL — HIGH (ref 3.8–10.5)

## 2021-06-14 PROCEDURE — 99232 SBSQ HOSP IP/OBS MODERATE 35: CPT

## 2021-06-14 RX ORDER — POTASSIUM CHLORIDE 20 MEQ
10 PACKET (EA) ORAL
Refills: 0 | Status: COMPLETED | OUTPATIENT
Start: 2021-06-14 | End: 2021-06-14

## 2021-06-14 RX ORDER — DEXTROSE MONOHYDRATE, SODIUM CHLORIDE, AND POTASSIUM CHLORIDE 50; .745; 4.5 G/1000ML; G/1000ML; G/1000ML
1000 INJECTION, SOLUTION INTRAVENOUS
Refills: 0 | Status: DISCONTINUED | OUTPATIENT
Start: 2021-06-14 | End: 2021-06-16

## 2021-06-14 RX ORDER — DOCUSATE SODIUM 100 MG
100 CAPSULE ORAL THREE TIMES A DAY
Refills: 0 | Status: DISCONTINUED | OUTPATIENT
Start: 2021-06-14 | End: 2021-06-17

## 2021-06-14 RX ORDER — POTASSIUM CHLORIDE 20 MEQ
40 PACKET (EA) ORAL EVERY 4 HOURS
Refills: 0 | Status: COMPLETED | OUTPATIENT
Start: 2021-06-14 | End: 2021-06-14

## 2021-06-14 RX ADMIN — Medication 40 MILLIEQUIVALENT(S): at 13:26

## 2021-06-14 RX ADMIN — HYDROMORPHONE HYDROCHLORIDE 0.5 MILLIGRAM(S): 2 INJECTION INTRAMUSCULAR; INTRAVENOUS; SUBCUTANEOUS at 20:47

## 2021-06-14 RX ADMIN — Medication 40 MILLIEQUIVALENT(S): at 10:17

## 2021-06-14 RX ADMIN — Medication 100 MILLIEQUIVALENT(S): at 10:33

## 2021-06-14 RX ADMIN — ENOXAPARIN SODIUM 40 MILLIGRAM(S): 100 INJECTION SUBCUTANEOUS at 13:26

## 2021-06-14 RX ADMIN — Medication 100 MILLIEQUIVALENT(S): at 09:31

## 2021-06-14 RX ADMIN — Medication 1000 MILLIGRAM(S): at 18:43

## 2021-06-14 RX ADMIN — Medication 30 MILLILITER(S): at 23:03

## 2021-06-14 RX ADMIN — Medication 30 MILLILITER(S): at 02:53

## 2021-06-14 RX ADMIN — PIPERACILLIN AND TAZOBACTAM 25 GRAM(S): 4; .5 INJECTION, POWDER, LYOPHILIZED, FOR SOLUTION INTRAVENOUS at 10:07

## 2021-06-14 RX ADMIN — HYDROMORPHONE HYDROCHLORIDE 1 MILLIGRAM(S): 2 INJECTION INTRAMUSCULAR; INTRAVENOUS; SUBCUTANEOUS at 06:46

## 2021-06-14 RX ADMIN — DEXTROSE MONOHYDRATE, SODIUM CHLORIDE, AND POTASSIUM CHLORIDE 50 MILLILITER(S): 50; .745; 4.5 INJECTION, SOLUTION INTRAVENOUS at 10:07

## 2021-06-14 RX ADMIN — Medication 1000 MILLIGRAM(S): at 11:47

## 2021-06-14 RX ADMIN — Medication 100 MILLIGRAM(S): at 13:26

## 2021-06-14 RX ADMIN — Medication 100 MILLIEQUIVALENT(S): at 11:47

## 2021-06-14 RX ADMIN — PIPERACILLIN AND TAZOBACTAM 25 GRAM(S): 4; .5 INJECTION, POWDER, LYOPHILIZED, FOR SOLUTION INTRAVENOUS at 01:30

## 2021-06-14 RX ADMIN — Medication 1000 MILLIGRAM(S): at 17:53

## 2021-06-14 RX ADMIN — Medication 1000 MILLIGRAM(S): at 12:47

## 2021-06-14 RX ADMIN — HYDROMORPHONE HYDROCHLORIDE 1 MILLIGRAM(S): 2 INJECTION INTRAMUSCULAR; INTRAVENOUS; SUBCUTANEOUS at 06:16

## 2021-06-14 RX ADMIN — PIPERACILLIN AND TAZOBACTAM 25 GRAM(S): 4; .5 INJECTION, POWDER, LYOPHILIZED, FOR SOLUTION INTRAVENOUS at 17:53

## 2021-06-14 RX ADMIN — HYDROMORPHONE HYDROCHLORIDE 0.5 MILLIGRAM(S): 2 INJECTION INTRAMUSCULAR; INTRAVENOUS; SUBCUTANEOUS at 21:00

## 2021-06-14 RX ADMIN — PANTOPRAZOLE SODIUM 40 MILLIGRAM(S): 20 TABLET, DELAYED RELEASE ORAL at 13:26

## 2021-06-14 NOTE — PROGRESS NOTE ADULT - PROBLEM SELECTOR PLAN 1
- pt without nausea since last episode, will resume CLD, but with small portions to start  - incentive spirometer  - pain control  - OOB  - IVF halved  - PO tylenol, IBU added to pain regiment  - serial abdominal exams  - labs in am    Surgical Team Contact Information  Spectralink: Ext: [phone] or [phone]  Pager: [phone]
POD #2   Plan:  Regressed to NPO with sips/chips  Would slowly advance pending return of bowel function/decrease in distention  Serial abdominal exams   Continue ambulation/DVT prophylaxis/incentive spirometry/pain control  E. Coli elucidated on surgical swab, increased leukocytosis noted - ID consult called, recs appreciated   Continue Zosyn for now  Discussed/seen with Dr. Alexandra  Will follow
61y Female s/p laparoscopic appendectomy for perforated appendicitis with abscess and generalized peritonitis.   - Keep NPO w NGT  - Maintain PIPE drain  - continue IV abx  - continue DVT ppx   - serial abdominal exams  - labs in AM  - pain control, supportive care, OOB, incentive spirometry  -Case to be discussed with Dr. Trejo
61y Female POD#4 s/p laparoscopic appendectomy for a perforated appendix with abscess and diffuse peritonitis. Pt with persistent abdominal pain. PIPE drain with minimal mostly serous output. WBC trending down, pt afebrile.  - Currently on CLD, however advised to go slow  - K 3.1, repleting   - pain control, supportive care, OOB, incentive spirometry  - continue IV abx  - continue DVT ppx   - serial abdominal exams  - labs in AM  -Case to be discussed with Dr. Vela

## 2021-06-14 NOTE — PROGRESS NOTE ADULT - ASSESSMENT
IMPRESSION with GI function returning, hypokalemia  PLAN advance diet           encourage OOB            replace KCL

## 2021-06-15 LAB
ALBUMIN SERPL ELPH-MCNC: 2 G/DL — LOW (ref 3.3–5)
ALP SERPL-CCNC: 155 U/L — HIGH (ref 40–120)
ALT FLD-CCNC: 35 U/L — SIGNIFICANT CHANGE UP (ref 12–78)
ANION GAP SERPL CALC-SCNC: 7 MMOL/L — SIGNIFICANT CHANGE UP (ref 5–17)
AST SERPL-CCNC: 22 U/L — SIGNIFICANT CHANGE UP (ref 15–37)
BILIRUB SERPL-MCNC: 0.7 MG/DL — SIGNIFICANT CHANGE UP (ref 0.2–1.2)
BUN SERPL-MCNC: 5 MG/DL — LOW (ref 7–23)
CALCIUM SERPL-MCNC: 7.9 MG/DL — LOW (ref 8.5–10.1)
CHLORIDE SERPL-SCNC: 105 MMOL/L — SIGNIFICANT CHANGE UP (ref 96–108)
CO2 SERPL-SCNC: 26 MMOL/L — SIGNIFICANT CHANGE UP (ref 22–31)
CREAT SERPL-MCNC: 0.5 MG/DL — SIGNIFICANT CHANGE UP (ref 0.5–1.3)
CULTURE RESULTS: SIGNIFICANT CHANGE UP
GLUCOSE SERPL-MCNC: 114 MG/DL — HIGH (ref 70–99)
HCT VFR BLD CALC: 30.9 % — LOW (ref 34.5–45)
HGB BLD-MCNC: 10.3 G/DL — LOW (ref 11.5–15.5)
MAGNESIUM SERPL-MCNC: 2.3 MG/DL — SIGNIFICANT CHANGE UP (ref 1.6–2.6)
MCHC RBC-ENTMCNC: 32.3 PG — SIGNIFICANT CHANGE UP (ref 27–34)
MCHC RBC-ENTMCNC: 33.3 GM/DL — SIGNIFICANT CHANGE UP (ref 32–36)
MCV RBC AUTO: 96.9 FL — SIGNIFICANT CHANGE UP (ref 80–100)
NRBC # BLD: 0 /100 WBCS — SIGNIFICANT CHANGE UP (ref 0–0)
ORGANISM # SPEC MICROSCOPIC CNT: SIGNIFICANT CHANGE UP
PHOSPHATE SERPL-MCNC: 2.8 MG/DL — SIGNIFICANT CHANGE UP (ref 2.5–4.5)
PLATELET # BLD AUTO: 408 K/UL — HIGH (ref 150–400)
POTASSIUM SERPL-MCNC: 3.8 MMOL/L — SIGNIFICANT CHANGE UP (ref 3.5–5.3)
POTASSIUM SERPL-SCNC: 3.8 MMOL/L — SIGNIFICANT CHANGE UP (ref 3.5–5.3)
PROT SERPL-MCNC: 6.1 G/DL — SIGNIFICANT CHANGE UP (ref 6–8.3)
RBC # BLD: 3.19 M/UL — LOW (ref 3.8–5.2)
RBC # FLD: 13.2 % — SIGNIFICANT CHANGE UP (ref 10.3–14.5)
SODIUM SERPL-SCNC: 138 MMOL/L — SIGNIFICANT CHANGE UP (ref 135–145)
SPECIMEN SOURCE: SIGNIFICANT CHANGE UP
WBC # BLD: 15.37 K/UL — HIGH (ref 3.8–10.5)
WBC # FLD AUTO: 15.37 K/UL — HIGH (ref 3.8–10.5)

## 2021-06-15 PROCEDURE — 99232 SBSQ HOSP IP/OBS MODERATE 35: CPT

## 2021-06-15 RX ORDER — CEFPODOXIME PROXETIL 100 MG
200 TABLET ORAL EVERY 12 HOURS
Refills: 0 | Status: DISCONTINUED | OUTPATIENT
Start: 2021-06-16 | End: 2021-06-17

## 2021-06-15 RX ORDER — PIPERACILLIN AND TAZOBACTAM 4; .5 G/20ML; G/20ML
3.38 INJECTION, POWDER, LYOPHILIZED, FOR SOLUTION INTRAVENOUS ONCE
Refills: 0 | Status: COMPLETED | OUTPATIENT
Start: 2021-06-16 | End: 2021-06-16

## 2021-06-15 RX ORDER — METRONIDAZOLE 500 MG
500 TABLET ORAL EVERY 8 HOURS
Refills: 0 | Status: DISCONTINUED | OUTPATIENT
Start: 2021-06-16 | End: 2021-06-17

## 2021-06-15 RX ADMIN — Medication 1000 MILLIGRAM(S): at 11:40

## 2021-06-15 RX ADMIN — PIPERACILLIN AND TAZOBACTAM 25 GRAM(S): 4; .5 INJECTION, POWDER, LYOPHILIZED, FOR SOLUTION INTRAVENOUS at 01:29

## 2021-06-15 RX ADMIN — Medication 600 MILLIGRAM(S): at 23:39

## 2021-06-15 RX ADMIN — PANTOPRAZOLE SODIUM 40 MILLIGRAM(S): 20 TABLET, DELAYED RELEASE ORAL at 11:10

## 2021-06-15 RX ADMIN — ENOXAPARIN SODIUM 40 MILLIGRAM(S): 100 INJECTION SUBCUTANEOUS at 11:10

## 2021-06-15 RX ADMIN — HYDROMORPHONE HYDROCHLORIDE 0.5 MILLIGRAM(S): 2 INJECTION INTRAMUSCULAR; INTRAVENOUS; SUBCUTANEOUS at 03:00

## 2021-06-15 RX ADMIN — DEXTROSE MONOHYDRATE, SODIUM CHLORIDE, AND POTASSIUM CHLORIDE 50 MILLILITER(S): 50; .745; 4.5 INJECTION, SOLUTION INTRAVENOUS at 05:36

## 2021-06-15 RX ADMIN — PIPERACILLIN AND TAZOBACTAM 25 GRAM(S): 4; .5 INJECTION, POWDER, LYOPHILIZED, FOR SOLUTION INTRAVENOUS at 17:19

## 2021-06-15 RX ADMIN — Medication 1000 MILLIGRAM(S): at 18:11

## 2021-06-15 RX ADMIN — Medication 1000 MILLIGRAM(S): at 11:10

## 2021-06-15 RX ADMIN — HYDROMORPHONE HYDROCHLORIDE 0.5 MILLIGRAM(S): 2 INJECTION INTRAMUSCULAR; INTRAVENOUS; SUBCUTANEOUS at 03:10

## 2021-06-15 RX ADMIN — PIPERACILLIN AND TAZOBACTAM 25 GRAM(S): 4; .5 INJECTION, POWDER, LYOPHILIZED, FOR SOLUTION INTRAVENOUS at 09:31

## 2021-06-15 RX ADMIN — DEXTROSE MONOHYDRATE, SODIUM CHLORIDE, AND POTASSIUM CHLORIDE 50 MILLILITER(S): 50; .745; 4.5 INJECTION, SOLUTION INTRAVENOUS at 05:37

## 2021-06-15 RX ADMIN — Medication 1000 MILLIGRAM(S): at 17:19

## 2021-06-16 LAB
ALBUMIN SERPL ELPH-MCNC: 2 G/DL — LOW (ref 3.3–5)
ALP SERPL-CCNC: 194 U/L — HIGH (ref 40–120)
ALT FLD-CCNC: 51 U/L — SIGNIFICANT CHANGE UP (ref 12–78)
ANION GAP SERPL CALC-SCNC: 5 MMOL/L — SIGNIFICANT CHANGE UP (ref 5–17)
AST SERPL-CCNC: 37 U/L — SIGNIFICANT CHANGE UP (ref 15–37)
BILIRUB SERPL-MCNC: 0.5 MG/DL — SIGNIFICANT CHANGE UP (ref 0.2–1.2)
BUN SERPL-MCNC: 4 MG/DL — LOW (ref 7–23)
CALCIUM SERPL-MCNC: 7.8 MG/DL — LOW (ref 8.5–10.1)
CHLORIDE SERPL-SCNC: 109 MMOL/L — HIGH (ref 96–108)
CO2 SERPL-SCNC: 27 MMOL/L — SIGNIFICANT CHANGE UP (ref 22–31)
CREAT SERPL-MCNC: 0.51 MG/DL — SIGNIFICANT CHANGE UP (ref 0.5–1.3)
GLUCOSE SERPL-MCNC: 114 MG/DL — HIGH (ref 70–99)
HCT VFR BLD CALC: 29.4 % — LOW (ref 34.5–45)
HGB BLD-MCNC: 9.6 G/DL — LOW (ref 11.5–15.5)
MCHC RBC-ENTMCNC: 31.9 PG — SIGNIFICANT CHANGE UP (ref 27–34)
MCHC RBC-ENTMCNC: 32.7 GM/DL — SIGNIFICANT CHANGE UP (ref 32–36)
MCV RBC AUTO: 97.7 FL — SIGNIFICANT CHANGE UP (ref 80–100)
NRBC # BLD: 0 /100 WBCS — SIGNIFICANT CHANGE UP (ref 0–0)
PLATELET # BLD AUTO: 415 K/UL — HIGH (ref 150–400)
POTASSIUM SERPL-MCNC: 3.6 MMOL/L — SIGNIFICANT CHANGE UP (ref 3.5–5.3)
POTASSIUM SERPL-SCNC: 3.6 MMOL/L — SIGNIFICANT CHANGE UP (ref 3.5–5.3)
PROT SERPL-MCNC: 6 G/DL — SIGNIFICANT CHANGE UP (ref 6–8.3)
RBC # BLD: 3.01 M/UL — LOW (ref 3.8–5.2)
RBC # FLD: 13.3 % — SIGNIFICANT CHANGE UP (ref 10.3–14.5)
SODIUM SERPL-SCNC: 141 MMOL/L — SIGNIFICANT CHANGE UP (ref 135–145)
WBC # BLD: 12.64 K/UL — HIGH (ref 3.8–10.5)
WBC # FLD AUTO: 12.64 K/UL — HIGH (ref 3.8–10.5)

## 2021-06-16 PROCEDURE — 99232 SBSQ HOSP IP/OBS MODERATE 35: CPT

## 2021-06-16 RX ORDER — IOHEXOL 300 MG/ML
15 INJECTION, SOLUTION INTRAVENOUS
Refills: 0 | Status: COMPLETED | OUTPATIENT
Start: 2021-06-17 | End: 2021-06-17

## 2021-06-16 RX ORDER — IOHEXOL 300 MG/ML
30 INJECTION, SOLUTION INTRAVENOUS ONCE
Refills: 0 | Status: DISCONTINUED | OUTPATIENT
Start: 2021-06-16 | End: 2021-06-16

## 2021-06-16 RX ORDER — CALCIUM CARBONATE 500(1250)
1 TABLET ORAL ONCE
Refills: 0 | Status: COMPLETED | OUTPATIENT
Start: 2021-06-16 | End: 2021-06-16

## 2021-06-16 RX ADMIN — Medication 500 MILLIGRAM(S): at 07:49

## 2021-06-16 RX ADMIN — Medication 30 MILLILITER(S): at 03:17

## 2021-06-16 RX ADMIN — Medication 600 MILLIGRAM(S): at 00:30

## 2021-06-16 RX ADMIN — PIPERACILLIN AND TAZOBACTAM 25 GRAM(S): 4; .5 INJECTION, POWDER, LYOPHILIZED, FOR SOLUTION INTRAVENOUS at 02:40

## 2021-06-16 RX ADMIN — Medication 1000 MILLIGRAM(S): at 23:02

## 2021-06-16 RX ADMIN — Medication 30 MILLILITER(S): at 21:06

## 2021-06-16 RX ADMIN — Medication 1 TABLET(S): at 18:50

## 2021-06-16 RX ADMIN — ENOXAPARIN SODIUM 40 MILLIGRAM(S): 100 INJECTION SUBCUTANEOUS at 11:06

## 2021-06-16 RX ADMIN — PANTOPRAZOLE SODIUM 40 MILLIGRAM(S): 20 TABLET, DELAYED RELEASE ORAL at 11:07

## 2021-06-16 RX ADMIN — Medication 200 MILLIGRAM(S): at 21:05

## 2021-06-16 RX ADMIN — Medication 1000 MILLIGRAM(S): at 11:30

## 2021-06-16 RX ADMIN — Medication 500 MILLIGRAM(S): at 16:13

## 2021-06-16 RX ADMIN — Medication 200 MILLIGRAM(S): at 07:49

## 2021-06-16 RX ADMIN — Medication 500 MILLIGRAM(S): at 23:02

## 2021-06-16 RX ADMIN — Medication 1000 MILLIGRAM(S): at 11:06

## 2021-06-17 ENCOUNTER — TRANSCRIPTION ENCOUNTER (OUTPATIENT)
Age: 61
End: 2021-06-17

## 2021-06-17 VITALS
TEMPERATURE: 98 F | SYSTOLIC BLOOD PRESSURE: 151 MMHG | RESPIRATION RATE: 18 BRPM | OXYGEN SATURATION: 95 % | HEART RATE: 62 BPM | WEIGHT: 162.04 LBS | DIASTOLIC BLOOD PRESSURE: 73 MMHG

## 2021-06-17 LAB
ANION GAP SERPL CALC-SCNC: 8 MMOL/L — SIGNIFICANT CHANGE UP (ref 5–17)
BUN SERPL-MCNC: 8 MG/DL — SIGNIFICANT CHANGE UP (ref 7–23)
CALCIUM SERPL-MCNC: 8.6 MG/DL — SIGNIFICANT CHANGE UP (ref 8.5–10.1)
CHLORIDE SERPL-SCNC: 105 MMOL/L — SIGNIFICANT CHANGE UP (ref 96–108)
CO2 SERPL-SCNC: 28 MMOL/L — SIGNIFICANT CHANGE UP (ref 22–31)
CREAT SERPL-MCNC: 0.5 MG/DL — SIGNIFICANT CHANGE UP (ref 0.5–1.3)
GLUCOSE SERPL-MCNC: 93 MG/DL — SIGNIFICANT CHANGE UP (ref 70–99)
HCT VFR BLD CALC: 33.8 % — LOW (ref 34.5–45)
HGB BLD-MCNC: 10.7 G/DL — LOW (ref 11.5–15.5)
MCHC RBC-ENTMCNC: 31.3 PG — SIGNIFICANT CHANGE UP (ref 27–34)
MCHC RBC-ENTMCNC: 31.7 GM/DL — LOW (ref 32–36)
MCV RBC AUTO: 98.8 FL — SIGNIFICANT CHANGE UP (ref 80–100)
NRBC # BLD: 0 /100 WBCS — SIGNIFICANT CHANGE UP (ref 0–0)
PLATELET # BLD AUTO: 513 K/UL — HIGH (ref 150–400)
POTASSIUM SERPL-MCNC: 3.7 MMOL/L — SIGNIFICANT CHANGE UP (ref 3.5–5.3)
POTASSIUM SERPL-SCNC: 3.7 MMOL/L — SIGNIFICANT CHANGE UP (ref 3.5–5.3)
RBC # BLD: 3.42 M/UL — LOW (ref 3.8–5.2)
RBC # FLD: 13.6 % — SIGNIFICANT CHANGE UP (ref 10.3–14.5)
SODIUM SERPL-SCNC: 141 MMOL/L — SIGNIFICANT CHANGE UP (ref 135–145)
WBC # BLD: 13 K/UL — HIGH (ref 3.8–10.5)
WBC # FLD AUTO: 13 K/UL — HIGH (ref 3.8–10.5)

## 2021-06-17 PROCEDURE — 93005 ELECTROCARDIOGRAM TRACING: CPT

## 2021-06-17 PROCEDURE — 36415 COLL VENOUS BLD VENIPUNCTURE: CPT

## 2021-06-17 PROCEDURE — 81001 URINALYSIS AUTO W/SCOPE: CPT

## 2021-06-17 PROCEDURE — 85027 COMPLETE CBC AUTOMATED: CPT

## 2021-06-17 PROCEDURE — 74177 CT ABD & PELVIS W/CONTRAST: CPT | Mod: 26

## 2021-06-17 PROCEDURE — 83735 ASSAY OF MAGNESIUM: CPT

## 2021-06-17 PROCEDURE — 80048 BASIC METABOLIC PNL TOTAL CA: CPT

## 2021-06-17 PROCEDURE — 87181 SC STD AGAR DILUTION PER AGT: CPT

## 2021-06-17 PROCEDURE — 87070 CULTURE OTHR SPECIMN AEROBIC: CPT

## 2021-06-17 PROCEDURE — 85610 PROTHROMBIN TIME: CPT

## 2021-06-17 PROCEDURE — 87077 CULTURE AEROBIC IDENTIFY: CPT

## 2021-06-17 PROCEDURE — 86803 HEPATITIS C AB TEST: CPT

## 2021-06-17 PROCEDURE — 87205 SMEAR GRAM STAIN: CPT

## 2021-06-17 PROCEDURE — C1889: CPT

## 2021-06-17 PROCEDURE — 96374 THER/PROPH/DIAG INJ IV PUSH: CPT

## 2021-06-17 PROCEDURE — 85730 THROMBOPLASTIN TIME PARTIAL: CPT

## 2021-06-17 PROCEDURE — 87186 SC STD MICRODIL/AGAR DIL: CPT

## 2021-06-17 PROCEDURE — 84100 ASSAY OF PHOSPHORUS: CPT

## 2021-06-17 PROCEDURE — 86769 SARS-COV-2 COVID-19 ANTIBODY: CPT

## 2021-06-17 PROCEDURE — 86850 RBC ANTIBODY SCREEN: CPT

## 2021-06-17 PROCEDURE — 87184 SC STD DISK METHOD PER PLATE: CPT

## 2021-06-17 PROCEDURE — 96361 HYDRATE IV INFUSION ADD-ON: CPT

## 2021-06-17 PROCEDURE — 80053 COMPREHEN METABOLIC PANEL: CPT

## 2021-06-17 PROCEDURE — 99285 EMERGENCY DEPT VISIT HI MDM: CPT | Mod: 25

## 2021-06-17 PROCEDURE — 86900 BLOOD TYPING SEROLOGIC ABO: CPT

## 2021-06-17 PROCEDURE — 86901 BLOOD TYPING SEROLOGIC RH(D): CPT

## 2021-06-17 PROCEDURE — 85025 COMPLETE CBC W/AUTO DIFF WBC: CPT

## 2021-06-17 PROCEDURE — 74177 CT ABD & PELVIS W/CONTRAST: CPT

## 2021-06-17 PROCEDURE — U0003: CPT

## 2021-06-17 PROCEDURE — 88304 TISSUE EXAM BY PATHOLOGIST: CPT

## 2021-06-17 PROCEDURE — 87075 CULTR BACTERIA EXCEPT BLOOD: CPT

## 2021-06-17 PROCEDURE — U0005: CPT

## 2021-06-17 PROCEDURE — 99232 SBSQ HOSP IP/OBS MODERATE 35: CPT

## 2021-06-17 RX ORDER — METRONIDAZOLE 500 MG
1 TABLET ORAL
Qty: 21 | Refills: 0
Start: 2021-06-17 | End: 2021-06-23

## 2021-06-17 RX ORDER — CEFPODOXIME PROXETIL 100 MG
1 TABLET ORAL
Qty: 14 | Refills: 0
Start: 2021-06-17 | End: 2021-06-23

## 2021-06-17 RX ORDER — IBUPROFEN 200 MG
1 TABLET ORAL
Qty: 0 | Refills: 0 | DISCHARGE

## 2021-06-17 RX ADMIN — IOHEXOL 15 MILLILITER(S): 300 INJECTION, SOLUTION INTRAVENOUS at 08:27

## 2021-06-17 RX ADMIN — Medication 200 MILLIGRAM(S): at 08:26

## 2021-06-17 RX ADMIN — IOHEXOL 15 MILLILITER(S): 300 INJECTION, SOLUTION INTRAVENOUS at 06:36

## 2021-06-17 RX ADMIN — Medication 1000 MILLIGRAM(S): at 05:34

## 2021-06-17 RX ADMIN — Medication 500 MILLIGRAM(S): at 08:26

## 2021-06-17 RX ADMIN — Medication 1000 MILLIGRAM(S): at 00:06

## 2021-06-17 RX ADMIN — Medication 1000 MILLIGRAM(S): at 06:38

## 2021-06-17 NOTE — PROGRESS NOTE ADULT - ASSESSMENT
IMPRESSION pt with fluid collections and elevated WBC  PLAN will continue oral antibiotics for one week            repeat ct scan as an out pt

## 2021-06-17 NOTE — DISCHARGE NOTE NURSING/CASE MANAGEMENT/SOCIAL WORK - PATIENT PORTAL LINK FT
You can access the FollowMyHealth Patient Portal offered by [facility] by registering at the following website: [email or web address]. By joining [facility]’s FollowMyHealth portal, you will also be able to view your health information using other applications (apps) compatible with our system.

## 2021-06-17 NOTE — PROGRESS NOTE ADULT - SUBJECTIVE AND OBJECTIVE BOX
[facility]  INFECTIOUS DISEASES   [address] [place] [address]  Tel: [phone]     Fax: [phone]  =======================================================    MRN-[id number]  EDD BUSH     Follow up; Perforated appendicitis    Pain is better, had flatus, no BM. No nausea or vomiting. On clear liquid tolerating.  Still has the drain with high out put. No fever.     PAST MEDICAL & SURGICAL HISTORY:  No pertinent past medical history  H/O  section    Social Hx: no smoking, ETOH or drugs     FAMILY HISTORY: noncontributory     Allergies  No Known Allergies    Antibiotics:  piperacillin/tazobactam IVPB.. 3.375 Gram(s) IV Intermittent every 8 hours    REVIEW OF SYSTEMS:  CONSTITUTIONAL:  No Fever or chills  HEENT:  No diplopia or blurred vision.  No sore throat or runny nose.  CARDIOVASCULAR:  No chest pain or SOB.  RESPIRATORY:  No cough, shortness of breath, PND or orthopnea.  GASTROINTESTINAL:  No nausea, vomiting or diarrhea. + abdominal pain and bloating   GENITOURINARY:  No dysuria, frequency or urgency. No Blood in urine  MUSCULOSKELETAL:  no joint aches, no muscle pain  SKIN:  No change in skin, hair or nails.  NEUROLOGIC:  No paresthesias, fasciculations, seizures or weakness.  PSYCHIATRIC:  No disorder of thought or mood.  ENDOCRINE:  No heat or cold intolerance, polyuria or polydipsia.  HEMATOLOGICAL:  No easy bruising or bleeding.     Physical Exam:  Vital Signs Last 24 Hrs  T(C): 36.8 (2021 13:09), Max: 36.9 (2021 04:56)  T(F): 98.3 (2021 13:09), Max: 98.4 (2021 04:56)  HR: 67 (2021 13:09) (67 - 76)  BP: 124/69 (2021 13:09) (124/69 - 160/80)  BP(mean): --  RR: 18 (2021 13:09) (17 - 18)  SpO2: 93% (2021 13:09) (93% - 97%)  GEN: NAD  HEENT: normocephalic and atraumatic. EOMI. PERRL.    NECK: Supple.  No lymphadenopathy   LUNGS: Clear to auscultation.  HEART: Regular rate and rhythm without murmur.  ABDOMEN: Soft, tender, dressing on surgical site, drain with serosanguinous fluid   : No CVA tenderness  EXTREMITIES: Without any cyanosis, clubbing, rash, lesions or edema.  NEUROLOGIC: grossly intact.  PSYCHIATRIC: Appropriate affect .  SKIN: No ulceration or induration present.    Labs:                        11.0   16.75 )-----------( 405      ( 2021 08:35 )             33.5     06-13    142  |  109<H>  |  11  ----------------------------<  94  3.5   |  25  |  0.51    Ca    8.1<L>      2021 08:35    Culture - Body Fluid with Gram Stain (collected 21 @ 00:27)  Source: .Body Fluid Peritoneal Fluid  Gram Stain (21 @ 04:51):    polymorphonuclear leukocytes seen    No organisms seen    by cytocentrifuge  Organism: Escherichia coli (21 @ 17:40)  Organism: Escherichia coli (21 @ 17:40)    Sensitivities:      -  Amikacin: S <=16      -  Amoxicillin/Clavulanic Acid: S <=8/4      -  Ampicillin: R >16 These ampicillin results predict results for amoxicillin      -  Ampicillin/Sulbactam: S 8/4 Enterobacter, Citrobacter, and Serratia may develop resistance during prolonged therapy (3-4 days)      -  Aztreonam: S <=4      -  Cefazolin: S <=2 Enterobacter, Citrobacter, and Serratia may develop resistance during prolonged therapy (3-4 days)      -  Cefepime: S <=2      -  Cefoxitin: S <=8      -  Ceftriaxone: S <=1 Enterobacter, Citrobacter, and Serratia may develop resistance during prolonged therapy      -  Ciprofloxacin: S <=0.25      -  Ertapenem: S <=0.5      -  Gentamicin: S <=2      -  Imipenem: S <=1      -  Levofloxacin: S <=0.5      -  Meropenem: S <=1      -  Piperacillin/Tazobactam: S <=8      -  Tobramycin: S <=2      -  Trimethoprim/Sulfamethoxazole: R >/      Method Type: TRINY    WBC Count: 16.75 K/uL (21 @ 08:35)  WBC Count: 20.61 K/uL (21 @ 07:34)  WBC Count: 16.33 K/uL (21 @ 07:04)  WBC Count: 19.75 K/uL (06-10-21 @ 15:54)    Creatinine, Serum: 0.51 mg/dL (21 @ 08:35)  Creatinine, Serum: 0.61 mg/dL (21 @ 07:34)  Creatinine, Serum: 0.53 mg/dL (21 @ 07:04)  Creatinine, Serum: 0.69 mg/dL (06-10-21 @ 15:54)    COVID-19 PCR: NotDetec (06-10-21 @ 16:00)    polymorphonuclear leukocytes seen  No organisms seen  by cytocentrifuge    All imaging and other data have been reviewed.    Assessment and Plan:   60 yo woman with no significant PMH was admitted with abdominal pain and had fever, nausea and vomiting but no diarrhea. Reportedly imaginag showed perforated Appendicitis with RLQ abscess, so went to OR on 6/10 night and had appendectomy.  No fever but leukocytosis is 20k which could be normal reaction after surgery.   Abscess culture with Ecoli which is expected, the most common bacteria in GI tract.     1- perforated appendicitis  - Fluid culture from OR with a pansensitive ecoli  - Continue Zosyn 3.375gm q8h to cover abdominal bob  - WBC 20k-->16k trending down  - Advance diet as per surgery.     Will follow.    Emelyn Benedict MD  Division of Infectious Diseases   Cell [phone] between 8am and 6pm   After 6pm and weekends please call ID service at [phone].     
    Subjective: pt doing well, anxious to go home.    Objective:  Vital Signs Last 24 Hrs  T(C): 36.7 (17 Jun 2021 05:42), Max: 36.9 (16 Jun 2021 14:33)  T(F): 98 (17 Jun 2021 05:42), Max: 98.4 (16 Jun 2021 14:33)  HR: 62 (17 Jun 2021 05:42) (62 - 64)  BP: 151/73 (17 Jun 2021 05:42) (122/79 - 161/79)  BP(mean): --  RR: 18 (17 Jun 2021 05:42) (18 - 18)  SpO2: 95% (17 Jun 2021 05:42) (95% - 98%)    Heent: BRIONNA, FREOM  Pul: clear  Cor: RSR, without murmurs  Abdomen: normal bowel sounds, without distension or tenderness  Daniel-d/jeff  Extremities: without edema, motor/sensory intact, without calf pain, Homans sign negative.                        10.7   13.00 )-----------( 513      ( 17 Jun 2021 07:20 )             33.8       06-17    141  |  105  |  8   ----------------------------<  93  3.7   |  28  |  0.50    Ca    8.6      17 Jun 2021 07:20    TPro  6.0  /  Alb  2.0<L>  /  TBili  0.5  /  DBili  x   /  AST  37  /  ALT  51  /  AlkPhos  194<H>  06-16 06-16 @ 07:01  -  06-17 @ 07:00  --------------------------------------------------------  IN:  Total IN: 0 mL    OUT:    Bulb (mL): 25 mL  Total OUT: 25 mL    Total NET: -25 mL      < from: CT Abdomen and Pelvis w/ Oral Cont and w/ IV Cont (06.17.21 @ 08:49) >  EXAM:  CT ABDOMEN AND PELVIS OC IC                            PROCEDURE DATE:  06/17/2021          INTERPRETATION:  CLINICAL INFORMATION: 61 years  Female with s/p lap ap for perf'd appy and lay pus in abdomen    eval for progression/evol.    COMPARISON: Prior outside CT is not available for review at the time interpretation.    CONTRAST/COMPLICATIONS:  IV Contrast: Omnipaque 350  90 cc administered   10 cc discarded  Oral Contrast: Omnipaque 300  Complications: None reported at time of studycompletion    PROCEDURE:  CT of the Abdomen and Pelvis was performed.  Sagittal and coronal reformats were performed.    FINDINGS:  LOWER CHEST: Within normal limits.    LIVER: 1.2 cm enhancing mass at the hepatic dome. 6 mm segment 4B cyst.  BILE DUCTS: Normal caliber.  GALLBLADDER: Within normal limits.  SPLEEN: Within normal limits.  PANCREAS: Atrophic pancreatic head and neck.  ADRENALS: Within normal limits.  KIDNEYS/URETERS: Within normal limits.    BLADDER: Within normal limits.  REPRODUCTIVE ORGANS: Unremarkable uterus.    BOWEL: Mildly distended small bowel measuring up to 3.4 cm.  PERITONEUM: Drainage catheter tip in the right lower quadrant lateral to the cecum. 6.2 x 2.1 cm tubular right lower quadrant fluid collection medial to the cecum (2:81). 1.2 x 1.6 cm collection anterior to the tubular structure (2:84). 1.4 cm round fluid collection in the left paracolic gutter (2:69). 2.3 x 1.5 cm interloop collection (2:69). Hyperdense fluid in the pelvis.  VESSELS: Atherosclerotic changes.  RETROPERITONEUM/LYMPH NODES: No lymphadenopathy.  ABDOMINAL WALL: Drainage catheter via a left parasagittal lower anterior approach subcutaneous fat stranding about the umbilicus.  BONES: Within normal limits.    IMPRESSION:  Pericecal tubular collection may represent retained appendix or postoperative hematoma, seroma or abscess.    Other small peritoneal collections consistent with seroma, hematoma or abscess.    Hyperdense fluid in the pelvis, either hematoma or purulent material.    Subcutaneous fat stranding about the umbilicus, either postsurgical or cellulitis.    Mild small bowel distention either postoperative ileus or early or partial obstruction. Follow-up advised.    Enhancing mass of the hepatic dome, possibly hemangioma.Recommend comparison with prior studies or nonemergent follow-up MRI.              KADE MURPHY MD; Attending Radiologist  This document has been electronically signed. Jun 17 2021  9:01AM    < end of copied text >      
    Subjective: pt oob, tolerating liquids, with bloating, with BM last pm.    Objective:  Vital Signs Last 24 Hrs  T(C): 36.9 (14 Jun 2021 04:40), Max: 36.9 (14 Jun 2021 04:40)  T(F): 98.4 (14 Jun 2021 04:40), Max: 98.4 (14 Jun 2021 04:40)  HR: 63 (14 Jun 2021 04:40) (63 - 71)  BP: 136/70 (14 Jun 2021 04:40) (124/69 - 168/78)  BP(mean): --  RR: 18 (14 Jun 2021 04:40) (18 - 18)  SpO2: 95% (14 Jun 2021 04:40) (93% - 95%)    Heent: EVIN STATON  Pul: clear  Cor: RSR, without murmurs  Abdomen: normal bowel sounds, without distension or tenderness  Incisions dressings intact  PIPE serous drainage.  Extremities: without edema, motor/sensory intact, without calf pain, Homans sign negative.                        10.0   13.17 )-----------( 364      ( 14 Jun 2021 07:03 )             29.6       06-14    140  |  105  |  9   ----------------------------<  104<H>  3.1<L>   |  26  |  0.45<L>    Ca    7.8<L>      14 Jun 2021 07:03          06-13 @ 07:01  -  06-14 @ 07:00  --------------------------------------------------------  IN:    IV PiggyBack: 100 mL    sodium chloride 0.9%: 420 mL  Total IN: 520 mL    OUT:    Bulb (mL): 350 mL    Voided (mL): 510 mL  Total OUT: 860 mL    Total NET: -340 mL          
[facility]  INFECTIOUS DISEASES   [address] [place] [address]  Tel: [phone]     Fax: [phone]  =======================================================    MRN-[id number]  EDD BUSH     Follow up; Perforated appendicitis    Clinically has improved,  No nausea or vomiting. Tolerating diet.   No fever.   WBC was 13k.   CT done today, mostly post op changes and collections.     PAST MEDICAL & SURGICAL HISTORY:  No pertinent past medical history  H/O  section    Social Hx: no smoking, ETOH or drugs     FAMILY HISTORY: noncontributory     Allergies  No Known Allergies    Antibiotics:  piperacillin/tazobactam IVPB.. 3.375 Gram(s) IV Intermittent every 8 hours    REVIEW OF SYSTEMS:  CONSTITUTIONAL:  No Fever or chills  HEENT:  No diplopia or blurred vision.  No sore throat or runny nose.  CARDIOVASCULAR:  No chest pain or SOB.  RESPIRATORY:  No cough, shortness of breath, PND or orthopnea.  GASTROINTESTINAL:  No nausea, vomiting or diarrhea. + abdominal pain and bloating   GENITOURINARY:  No dysuria, frequency or urgency. No Blood in urine  MUSCULOSKELETAL:  no joint aches, no muscle pain  SKIN:  No change in skin, hair or nails.  NEUROLOGIC:  No paresthesias, fasciculations, seizures or weakness.  PSYCHIATRIC:  No disorder of thought or mood.  ENDOCRINE:  No heat or cold intolerance, polyuria or polydipsia.  HEMATOLOGICAL:  No easy bruising or bleeding.     Physical Exam:  Vital Signs Last 24 Hrs  T(C): 36.8 (2021 05:09), Max: 36.9 (15 Virgil 2021 20:03)  T(F): 98.2 (2021 05:09), Max: 98.4 (15 Virgil 2021 20:03)  HR: 69 (2021 05:09) (62 - 69)  BP: 139/73 (2021 05:09) (126/68 - 139/73)  BP(mean): --  RR: 18 (2021 05:09) (18 - 18)  SpO2: 98% (2021 05:09) (98% - 98%)  GEN: NAD  HEENT: normocephalic and atraumatic. EOMI. PERRL.    NECK: Supple.  No lymphadenopathy   LUNGS: Clear to auscultation.  HEART: Regular rate and rhythm without murmur.  ABDOMEN: Soft, tender, dressing on surgical site, drain with serosanguinous fluid   : No CVA tenderness  EXTREMITIES: Without any cyanosis, clubbing, rash, lesions or edema.  NEUROLOGIC: grossly intact.  PSYCHIATRIC: Appropriate affect .  SKIN: No ulceration or induration present.    Labs:                        10.7   13.00 )-----------( 513      ( 2021 07:20 )             33.8         141  |  105  |  8   ----------------------------<  93  3.7   |  28  |  0.50    Ca    8.6      2021 07:20    TPro  6.0  /  Alb  2.0<L>  /  TBili  0.5  /  DBili  x   /  AST  37  /  ALT  51  /  AlkPhos  194<H>      Culture - Body Fluid with Gram Stain (collected 21 @ 00:27)  Source: .Body Fluid Peritoneal Fluid  Gram Stain (21 @ 04:51):    polymorphonuclear leukocytes seen    No organisms seen    by cytocentrifuge  Final Report (06-15-21 @ 17:35):    Few Escherichia coli    Numerous Streptococcus intermedius  Organism: Escherichia coli  Streptococcus intermedius (06-15-21 @ 17:35)  Organism: Streptococcus intermedius (06-15-21 @ 17:35)    Sensitivities:      -  Ceftriaxone: S 0.125      -  Penicillin: S 0.023      Method Type: ETEST  Organism: Streptococcus intermedius (06-15-21 @ 17:35)    Sensitivities:      -  Clindamycin: S      -  Erythromycin: S      -  Levofloxacin: S      -  Vancomycin: S      Method Type: KB  Organism: Escherichia coli (06-15-21 @ 17:35)    Sensitivities:      -  Amikacin: S <=16      -  Amoxicillin/Clavulanic Acid: S <=8/4      -  Ampicillin: R >16 These ampicillin results predict results for amoxicillin      -  Ampicillin/Sulbactam: S 8/4 Enterobacter, Citrobacter, and Serratia may develop resistance during prolonged therapy (3-4 days)      -  Aztreonam: S <=4      -  Cefazolin: S <=2 Enterobacter, Citrobacter, and Serratia may develop resistance during prolonged therapy (3-4 days)      -  Cefepime: S <=2      -  Cefoxitin: S <=8      -  Ceftriaxone: S <=1 Enterobacter, Citrobacter, and Serratia may develop resistance during prolonged therapy      -  Ciprofloxacin: S <=0.25      -  Ertapenem: S <=0.5      -  Gentamicin: S <=2      -  Imipenem: S <=1      -  Levofloxacin: S <=0.5      -  Meropenem: S <=1      -  Piperacillin/Tazobactam: S <=8      -  Tobramycin: S <=2      -  Trimethoprim/Sulfamethoxazole: R >      Method Type: TRINY    WBC Count: 13.00 K/uL (21 @ 07:20)  WBC Count: 12.64 K/uL (21 @ 07:01)  WBC Count: 15.37 K/uL (06-15-21 @ 06:38)  WBC Count: 13.17 K/uL (21 @ 07:03)  WBC Count: 16.75 K/uL (21 @ 08:35)    Creatinine, Serum: 0.50 mg/dL (21 @ 07:20)  Creatinine, Serum: 0.51 mg/dL (21 @ 07:01)  Creatinine, Serum: 0.50 mg/dL (06-15-21 @ 06:38)  Creatinine, Serum: 0.45 mg/dL (21 @ 07:03)  Creatinine, Serum: 0.51 mg/dL (21 @ 08:35)    COVID-19 PCR: NotDetec (06-10-21 @ 16:00)    All imaging and other data have been reviewed.  < from: CT Abdomen and Pelvis w/ Oral Cont and w/ IV Cont (21 @ 08:49) >  IMPRESSION:  Pericecal tubular collection may represent retained appendix or postoperative hematoma, seroma or abscess.  Other small peritoneal collections consistent with seroma, hematoma or abscess.  Hyperdense fluid in the pelvis, either hematoma or purulent material.  Subcutaneous fat stranding about the umbilicus, either postsurgical or cellulitis.  Mild small bowel distention either postoperative ileus or early or partial obstruction. Follow-up advised.  Enhancing mass of the hepatic dome, possibly hemangioma.Recommend comparison with prior studies or nonemergent follow-up MRI.    Assessment and Plan:   60 yo woman with no significant PMH was admitted with abdominal pain and had fever, nausea and vomiting but no diarrhea. Reportedly imaginag showed perforated Appendicitis with RLQ abscess, so went to OR on 6/10 night and had appendectomy.  No fever but leukocytosis is 20k which could be normal reaction after surgery.   Abscess culture with Ecoli which is expected, the most common bacteria in GI tract.     1- perforated appendicitis  - Fluid culture from OR with a pansensitive ecoli  - WBC 20k-->16k -->13k   - On regular diet, had BM  - Continue Vantin 200mg q12 and Metronidazole 500mg q5h for one more week  - CT this morning with post op changes, collections/hemtoma/seroma  - Since clinically has improved significantly, can discharge and repeat CT as outpt.     Will sign off please call with any question.     Emelyn Benedict MD  Division of Infectious Diseases   Cell [phone] between 8am and 6pm   After 6pm and weekends please call ID service at [phone].     
DEPARTMENT OF ANESTHESIA  POST ANESTHETIC EVALUATION    The Patient was interviewed and evaluated    Vital Signs Last 24 Hrs  T(C): 36.6 (11 Jun 2021 04:00), Max: 39.3 (10 Virgil 2021 19:23)  T(F): 97.9 (11 Jun 2021 04:00), Max: 102.8 (10 Virgil 2021 19:23)  HR: 69 (11 Jun 2021 04:00) (69 - 97)  BP: 121/72 (11 Jun 2021 04:00) (118/65 - 151/72)  BP(mean): --  RR: 17 (11 Jun 2021 04:00) (15 - 18)  SpO2: 93% (11 Jun 2021 04:00) (93% - 99%)    Evaluation:      (x ) No apparent complications or complaints regarding anesthesia care at this time  (x ) All questions were answered    Condition:  (x ) Stable      ( ) Guarded      ( ) Critical    Recommendations:  (x) None     ( ) Other:  
[facility]  INFECTIOUS DISEASES   [address] [place] [address]  Tel: [phone]     Fax: [phone]  =======================================================    MRN-[id number]  EDD BUSH     Follow up; Perforated appendicitis    Pain is better,  No nausea or vomiting. Tolerating diet.   Still has the drain, No fever.   WBC is 13k.     PAST MEDICAL & SURGICAL HISTORY:  No pertinent past medical history  H/O  section    Social Hx: no smoking, ETOH or drugs     FAMILY HISTORY: noncontributory     Allergies  No Known Allergies    Antibiotics:  piperacillin/tazobactam IVPB.. 3.375 Gram(s) IV Intermittent every 8 hours    REVIEW OF SYSTEMS:  CONSTITUTIONAL:  No Fever or chills  HEENT:  No diplopia or blurred vision.  No sore throat or runny nose.  CARDIOVASCULAR:  No chest pain or SOB.  RESPIRATORY:  No cough, shortness of breath, PND or orthopnea.  GASTROINTESTINAL:  No nausea, vomiting or diarrhea. + abdominal pain and bloating   GENITOURINARY:  No dysuria, frequency or urgency. No Blood in urine  MUSCULOSKELETAL:  no joint aches, no muscle pain  SKIN:  No change in skin, hair or nails.  NEUROLOGIC:  No paresthesias, fasciculations, seizures or weakness.  PSYCHIATRIC:  No disorder of thought or mood.  ENDOCRINE:  No heat or cold intolerance, polyuria or polydipsia.  HEMATOLOGICAL:  No easy bruising or bleeding.     Physical Exam:  Vital Signs Last 24 Hrs  T(C): 36.8 (2021 05:09), Max: 36.9 (15 Virgil 2021 20:03)  T(F): 98.2 (2021 05:09), Max: 98.4 (15 Virgil 2021 20:03)  HR: 69 (2021 05:09) (62 - 69)  BP: 139/73 (2021 05:09) (126/68 - 139/73)  BP(mean): --  RR: 18 (2021 05:09) (18 - 18)  SpO2: 98% (2021 05:09) (98% - 98%)  GEN: NAD  HEENT: normocephalic and atraumatic. EOMI. PERRL.    NECK: Supple.  No lymphadenopathy   LUNGS: Clear to auscultation.  HEART: Regular rate and rhythm without murmur.  ABDOMEN: Soft, tender, dressing on surgical site, drain with serosanguinous fluid   : No CVA tenderness  EXTREMITIES: Without any cyanosis, clubbing, rash, lesions or edema.  NEUROLOGIC: grossly intact.  PSYCHIATRIC: Appropriate affect .  SKIN: No ulceration or induration present.    Labs:                        9.6    12.64 )-----------( 415      ( 2021 07:01 )             29.4     06-    141  |  109<H>  |  4<L>  ----------------------------<  114<H>  3.6   |  27  |  0.51    Ca    7.8<L>      2021 07:01  Phos  2.8     06-15  Mg     2.3     06-15    TPro  6.0  /  Alb  2.0<L>  /  TBili  0.5  /  DBili  x   /  AST  37  /  ALT  51  /  AlkPhos  194<H>      Culture - Body Fluid with Gram Stain (collected 21 @ 00:27)  Source: .Body Fluid Peritoneal Fluid  Gram Stain (21 @ 04:51):    polymorphonuclear leukocytes seen    No organisms seen    by cytocentrifuge  Final Report (06-15-21 @ 17:35):    Few Escherichia coli    Numerous Streptococcus intermedius  Organism: Escherichia coli  Streptococcus intermedius (06-15-21 @ 17:35)  Organism: Streptococcus intermedius (06-15-21 @ 17:35)    Sensitivities:      -  Ceftriaxone: S 0.125      -  Penicillin: S 0.023      Method Type: ETEST  Organism: Streptococcus intermedius (06-15-21 @ 17:35)    Sensitivities:      -  Clindamycin: S      -  Erythromycin: S      -  Levofloxacin: S      -  Vancomycin: S      Method Type: KB  Organism: Escherichia coli (06-15-21 @ 17:35)    Sensitivities:      -  Amikacin: S <=16      -  Amoxicillin/Clavulanic Acid: S <=8/4      -  Ampicillin: R >16 These ampicillin results predict results for amoxicillin      -  Ampicillin/Sulbactam: S 8/4 Enterobacter, Citrobacter, and Serratia may develop resistance during prolonged therapy (3-4 days)      -  Aztreonam: S <=4      -  Cefazolin: S <=2 Enterobacter, Citrobacter, and Serratia may develop resistance during prolonged therapy (3-4 days)      -  Cefepime: S <=2      -  Cefoxitin: S <=8      -  Ceftriaxone: S <=1 Enterobacter, Citrobacter, and Serratia may develop resistance during prolonged therapy      -  Ciprofloxacin: S <=0.25      -  Ertapenem: S <=0.5      -  Gentamicin: S <=2      -  Imipenem: S <=1      -  Levofloxacin: S <=0.5      -  Meropenem: S <=1      -  Piperacillin/Tazobactam: S <=8      -  Tobramycin: S <=2      -  Trimethoprim/Sulfamethoxazole: R >      Method Type: TRINY    WBC Count: 12.64 K/uL (21 @ 07:01)  WBC Count: 15.37 K/uL (06-15-21 @ 06:38)  WBC Count: 13.17 K/uL (21 @ 07:03)  WBC Count: 16.75 K/uL (21 @ 08:35)  WBC Count: 20.61 K/uL (21 @ 07:34)    Creatinine, Serum: 0.51 mg/dL (21 @ 07:01)  Creatinine, Serum: 0.50 mg/dL (06-15-21 @ 06:38)  Creatinine, Serum: 0.45 mg/dL (21 @ 07:03)  Creatinine, Serum: 0.51 mg/dL (21 @ 08:35)  Creatinine, Serum: 0.61 mg/dL (21 @ 07:34)    COVID-19 PCR: NotDetec (06-10-21 @ 16:00)    All imaging and other data have been reviewed.    Assessment and Plan:   60 yo woman with no significant PMH was admitted with abdominal pain and had fever, nausea and vomiting but no diarrhea. Reportedly imaginag showed perforated Appendicitis with RLQ abscess, so went to OR on 6/10 night and had appendectomy.  No fever but leukocytosis is 20k which could be normal reaction after surgery.   Abscess culture with Ecoli which is expected, the most common bacteria in GI tract.     1- perforated appendicitis  - Fluid culture from OR with a pansensitive ecoli  - WBC 20k-->16k -->13k   - On regular diet, had BM  - Can switch to Vantin 200mg q12 and Metronidazole 500mg q5h   - Plan is CT tomorrow to make sure there is no abscess    Will follow PRN.    Emelyn Benedict MD  Division of Infectious Diseases   Cell [phone] between 8am and 6pm   After 6pm and weekends please call ID service at [phone].       
    Subjective: pt improved, decreased pain    Objective:  Vital Signs Last 24 Hrs  T(C): 36.6 (11 Jun 2021 04:00), Max: 39.3 (10 Virgil 2021 19:23)  T(F): 97.9 (11 Jun 2021 04:00), Max: 102.8 (10 Virgil 2021 19:23)  HR: 69 (11 Jun 2021 04:00) (69 - 97)  BP: 121/72 (11 Jun 2021 04:00) (118/65 - 151/72)  BP(mean): --  RR: 17 (11 Jun 2021 04:00) (15 - 18)  SpO2: 93% (11 Jun 2021 04:00) (93% - 99%)    Heent: BRIONNA, FREOM  Pul: decreased at bases  Cor: RSR, without murmurs  Abdomen: decreased bowel sounds, without distension  Incisions clean and closed, dressings intact  PIPE serosanguinous drainage  Extremities: without edema, motor/sensory intact, without calf pain, Homans sign negative.                        10.6   16.33 )-----------( 271      ( 11 Jun 2021 07:04 )             32.4       06-11    140  |  108  |  7   ----------------------------<  148<H>  3.9   |  24  |  0.53    Ca    7.8<L>      11 Jun 2021 07:04    TPro  6.4  /  Alb  2.1<L>  /  TBili  2.1<H>  /  DBili  x   /  AST  27  /  ALT  78  /  AlkPhos  150<H>  06-11        06-10 @ 07:01  -  06-11 @ 07:00  --------------------------------------------------------  IN:    Lactated Ringers: 625 mL    Lactated Ringers: 1000 mL  Total IN: 1625 mL    OUT:    Bulb (mL): 28 mL    Indwelling Catheter - Urethral (mL): 1305 mL    Nasogastric/Oral tube (mL): 0 mL  Total OUT: 1333 mL    Total NET: 292 mL      06-11 @ 07:01 - 06-11 @ 09:35  --------------------------------------------------------  IN:  Total IN: 0 mL    OUT:    Indwelling Catheter - Urethral (mL): 400 mL  Total OUT: 400 mL    Total NET: -400 mL          
POST OPERATIVE DAY #4  STATUS POST: Laparoscopic appendectomy    SUBJECTIVE:  Patient seen and examined at bedside.  Patient states that she still has some persistent abdominal pain that is well-managed with pain medication. Pt is on CLD, but is taking it slow and has been eating very minimally due to pain. Denies passage of flatus or BM. Patient denies any fevers, chills, chest pain, shortness of breath, nausea, vomiting or diarrhea.    Vital Signs Last 24 Hrs  T(C): 36.9 (14 Jun 2021 04:40), Max: 36.9 (14 Jun 2021 04:40)  T(F): 98.4 (14 Jun 2021 04:40), Max: 98.4 (14 Jun 2021 04:40)  HR: 63 (14 Jun 2021 04:40) (63 - 71)  BP: 136/70 (14 Jun 2021 04:40) (124/69 - 168/78)  BP(mean): --  RR: 18 (14 Jun 2021 04:40) (18 - 18)  SpO2: 95% (14 Jun 2021 04:40) (93% - 95%)    PHYSICAL EXAM:  GENERAL: No acute distress, well-developed  HEAD:  Atraumatic, Normocephalic  ABDOMEN: Softly distended, diffusely ttp; trocar sites dressed with 4x4 and Tegederm, C/D/I; PIPE drain with minimal mostly serous output  NEUROLOGY: A&O x 3, no focal deficits    I&O's Summary    13 Jun 2021 07:01  -  14 Jun 2021 07:00  --------------------------------------------------------  IN: 520 mL / OUT: 860 mL / NET: -340 mL    MEDICATIONS  (STANDING):  acetaminophen   Tablet .. 1000 milliGRAM(s) Oral every 6 hours  enoxaparin Injectable 40 milliGRAM(s) SubCutaneous daily  pantoprazole  Injectable 40 milliGRAM(s) IV Push daily  piperacillin/tazobactam IVPB.. 3.375 Gram(s) IV Intermittent every 8 hours  senna 2 Tablet(s) Oral at bedtime  sodium chloride 0.9%. 1000 milliLiter(s) (35 mL/Hr) IV Continuous <Continuous>    MEDICATIONS  (PRN):  aluminum hydroxide/magnesium hydroxide/simethicone Suspension 30 milliLiter(s) Oral every 4 hours PRN Dyspepsia  HYDROmorphone  Injectable 1 milliGRAM(s) IV Push every 4 hours PRN Severe Pain (7 - 10)  HYDROmorphone  Injectable 0.5 milliGRAM(s) IV Push every 4 hours PRN Moderate Pain (4 - 6)  ibuprofen  Tablet. 600 milliGRAM(s) Oral every 8 hours PRN Mild Pain (1 - 3)  ondansetron Injectable 4 milliGRAM(s) IV Push every 6 hours PRN Nausea and/or Vomiting    LABS:                        10.0   13.17 )-----------( 364      ( 14 Jun 2021 07:03 )             29.6     06-14    140  |  105  |  9   ----------------------------<  104<H>  3.1<L>   |  26  |  0.45<L>    Ca    7.8<L>      14 Jun 2021 07:03    RADIOLOGY & ADDITIONAL STUDIES: no studies    
STATUS POST: laparoscopic appendectomy    SUBJECTIVE:  Patient seen and examined at bedside.  Patient states that she feels okay but her abdomen is very sore. Currently NPO w NGT in place. Patient denies any fevers, chills, chest pain, shortness of breath, nausea, vomiting or diarrhea.    Vital Signs Last 24 Hrs  T(C): 37 (2021 00:26), Max: 39.3 (10 Virgil 2021 19:23)  T(F): 98.6 (2021 00:), Max: 102.8 (10 Virgil 2021 19:23)  HR: 79 (:) (70 - 97)  BP: 130/75 (:) (118/65 - 151/72)  BP(mean): --  RR: 17 (:) (15 - 18)  SpO2: 95% (:) (95% - 99%)    PHYSICAL EXAM:  GENERAL: No acute distress, well-developed  HEAD:  Atraumatic, Normocephalic  CHEST/LUNG: CTAB; No wheezes, rales, or rhonchi  HEART: Regular rate and rhythm; No murmurs, rubs, or gallops  ABDOMEN: Soft, mildly distended, RLQ ttp and appropriate incisional ttp; trocar & incisional sites dressed w 4x4s and tegederm, C/D/I; PIPE drain in place w minimal serosanguinous output  NEUROLOGY: A&O x 3, no focal deficits    I&O's Summary    10 Virgil 2021 07:01  -  2021 00:49  --------------------------------------------------------  IN: 500 mL / OUT: 230 mL / NET: 270 mL    MEDICATIONS  (STANDING):  acetaminophen  IVPB .. 1000 milliGRAM(s) IV Intermittent once  enoxaparin Injectable 40 milliGRAM(s) SubCutaneous daily  lactated ringers. 1000 milliLiter(s) (125 mL/Hr) IV Continuous <Continuous>  pantoprazole  Injectable 40 milliGRAM(s) IV Push daily  piperacillin/tazobactam IVPB.. 3.375 Gram(s) IV Intermittent every 8 hours    MEDICATIONS  (PRN):  HYDROmorphone  Injectable 0.5 milliGRAM(s) IV Push every 4 hours PRN Moderate Pain (4 - 6)  HYDROmorphone  Injectable 1 milliGRAM(s) IV Push every 4 hours PRN Severe Pain (7 - 10)  HYDROmorphone  Injectable 0.5 milliGRAM(s) IV Push every 10 minutes PRN Severe Pain (7 - 10)  ondansetron Injectable 4 milliGRAM(s) IV Push once PRN Nausea and/or Vomiting  ondansetron Injectable 4 milliGRAM(s) IV Push every 6 hours PRN Nausea and/or Vomiting  oxyCODONE    IR 5 milliGRAM(s) Oral once PRN Moderate Pain (4 - 6)    LABS:                        12.9   19.75 )-----------( 337      ( 10 Virgil 2021 15:54 )             38.6     06-10    135  |  101  |  9   ----------------------------<  128<H>  3.6   |  24  |  0.69    Ca    8.5      10 Virgil 2021 15:54    TPro  8.3  /  Alb  3.0<L>  /  TBili  1.7<H>  /  DBili  x   /  AST  39<H>  /  ALT  111<H>  /  AlkPhos  209<H>  06-10    PT/INR - ( 10 Virgil 2021 15:59 )   PT: 15.1 sec;   INR: 1.31 ratio      PTT - ( 10 Virgil 2021 15:59 )  PTT:35.2 sec  Urinalysis Basic - ( 10 Virgil 2021 17:10 )    Color: Valentina / Appearance: Clear / S.015 / pH: x  Gluc: x / Ketone: Small  / Bili: Moderate / Urobili: 8   Blood: x / Protein: 30 mg/dL / Nitrite: Positive   Leuk Esterase: Trace / RBC: 3-5 /HPF / WBC 3-5   Sq Epi: x / Non Sq Epi: Occasional / Bacteria: Few    RADIOLOGY & ADDITIONAL STUDIES: no post-operative studies  
SURGERY PA NOTE ON BEHALF OF DR. VELA / GENERAL SURGERY:    S: Patient seen and examined at bedside.  Reports further improvement in abdominal pain with resolution of bloating.  Has been passing flatus, and now having BM's (described as normal).  +Urinating, ambulating without issue.  Tolerating diet without N/V.  Denies F/C, denies CP/SOB/MCDERMOTT/palpitations/dizziness/lightheadedness.      MEDICATIONS:  acetaminophen   Tablet .. 1000 milliGRAM(s) Oral every 6 hours  aluminum hydroxide/magnesium hydroxide/simethicone Suspension 30 milliLiter(s) Oral every 4 hours PRN  cefpodoxime 200 milliGRAM(s) Oral every 12 hours  docusate sodium 100 milliGRAM(s) Oral three times a day  enoxaparin Injectable 40 milliGRAM(s) SubCutaneous daily  HYDROmorphone  Injectable 1 milliGRAM(s) IV Push every 4 hours PRN  HYDROmorphone  Injectable 0.5 milliGRAM(s) IV Push every 4 hours PRN  ibuprofen  Tablet. 600 milliGRAM(s) Oral every 8 hours PRN  metroNIDAZOLE    Tablet 500 milliGRAM(s) Oral every 8 hours  ondansetron Injectable 4 milliGRAM(s) IV Push every 6 hours PRN  pantoprazole  Injectable 40 milliGRAM(s) IV Push daily      O:  Vital Signs Last 24 Hrs  T(C): 36.8 (16 Jun 2021 05:09), Max: 36.9 (15 Virgil 2021 20:03)  T(F): 98.2 (16 Jun 2021 05:09), Max: 98.4 (15 Virgil 2021 20:03)  HR: 69 (16 Jun 2021 05:09) (62 - 69)  BP: 139/73 (16 Jun 2021 05:09) (126/68 - 139/73)  BP(mean): --  RR: 18 (16 Jun 2021 05:09) (18 - 18)  SpO2: 98% (16 Jun 2021 05:09) (98% - 98%)    I&O SUMMARY:    06-15-21 @ 07:01  -  06-16-21 @ 07:00  --------------------------------------------------------  IN: 1260 mL / OUT: 4549 mL / NET: -3289 mL        PHYSICAL EXAM:  Lungs: CTA bilat without W/R/R  Card: S1S2  Abd: Dressings all c/d/i.  Soft, NT, ND.  +BS x 4.  No rebound/guarding.  J-P drain with serous fluid in grenade.    Ext: Calves soft, NT, without edema bilat    LABS:                        9.6    12.64 )-----------( 415      ( 16 Jun 2021 07:01 )             29.4     06-16    141  |  109<H>  |  4<L>  ----------------------------<  114<H>  3.6   |  27  |  0.51    Ca    7.8<L>      16 Jun 2021 07:01  Phos  2.8     06-15  Mg     2.3     06-15    TPro  6.0  /  Alb  2.0<L>  /  TBili  0.5  /  DBili  x   /  AST  37  /  ALT  51  /  AlkPhos  194<H>  06-16      A:  61y Female POD#4 s/p laparoscopic appendectomy for a perforated appendix with abscess and diffuse peritonitis    P:  Improving clinically with regard to bowel function/diet tolerance  Has been afebrile, with stable/reassuring VS  Though downtrending, still with persistent leukocytosis despite surgical treatment 6d ago and continued abx  Will repeat CT scan with PO/IV contrast tomorrow morning to r/o development/evolution of abscess  NPO p MN for CT  Continue current care, continue abx per ID  Discussed with Dr. Vela, will follow       
    Subjective: Pt OOB, tolerating a regular diet    Objective:  Vital Signs Last 24 Hrs  T(C): 36.8 (16 Jun 2021 05:09), Max: 36.9 (15 Virgil 2021 20:03)  T(F): 98.2 (16 Jun 2021 05:09), Max: 98.4 (15 Virgil 2021 20:03)  HR: 69 (16 Jun 2021 05:09) (62 - 69)  BP: 139/73 (16 Jun 2021 05:09) (126/68 - 139/73)  BP(mean): --  RR: 18 (16 Jun 2021 05:09) (18 - 18)  SpO2: 98% (16 Jun 2021 05:09) (98% - 98%)    Heent: EVIN STATON  Pul: clear  Cor: RSR, without murmurs  Abdomen: normal bowel sounds, without distension or tenderness  Incisions clean and closed  PIPE with serous drainage  Extremities: without edema, motor/sensory intact, without calf pain, Homans sign negative.                        9.6    12.64 )-----------( 415      ( 16 Jun 2021 07:01 )             29.4       06-16    141  |  109<H>  |  4<L>  ----------------------------<  114<H>  3.6   |  27  |  0.51    Ca    7.8<L>      16 Jun 2021 07:01  Phos  2.8     06-15  Mg     2.3     06-15    TPro  6.0  /  Alb  2.0<L>  /  TBili  0.5  /  DBili  x   /  AST  37  /  ALT  51  /  AlkPhos  194<H>  06-16        06-15 @ 07:01  -  06-16 @ 07:00  --------------------------------------------------------  IN:    dextrose 5% + sodium chloride 0.45% w/ Additives: 600 mL    Oral Fluid: 660 mL  Total IN: 1260 mL    OUT:    Bulb (mL): 195 mL    Stool (mL): 4 mL    Voided (mL): 4350 mL  Total OUT: 4549 mL    Total NET: -3289 mL          
[facility]  INFECTIOUS DISEASES   [address] [place] [address]  Tel: [phone]     Fax: [phone]  =======================================================    [id number]  EDD BUSH     Follow up; Perforated appendicitis    Pain is better,  No nausea or vomiting. Tolerating diet.   Still has the drain but with less out put. No fever.     PAST MEDICAL & SURGICAL HISTORY:  No pertinent past medical history  H/O  section    Social Hx: no smoking, ETOH or drugs     FAMILY HISTORY: noncontributory     Allergies  No Known Allergies    Antibiotics:  piperacillin/tazobactam IVPB.. 3.375 Gram(s) IV Intermittent every 8 hours    REVIEW OF SYSTEMS:  CONSTITUTIONAL:  No Fever or chills  HEENT:  No diplopia or blurred vision.  No sore throat or runny nose.  CARDIOVASCULAR:  No chest pain or SOB.  RESPIRATORY:  No cough, shortness of breath, PND or orthopnea.  GASTROINTESTINAL:  No nausea, vomiting or diarrhea. + abdominal pain and bloating   GENITOURINARY:  No dysuria, frequency or urgency. No Blood in urine  MUSCULOSKELETAL:  no joint aches, no muscle pain  SKIN:  No change in skin, hair or nails.  NEUROLOGIC:  No paresthesias, fasciculations, seizures or weakness.  PSYCHIATRIC:  No disorder of thought or mood.  ENDOCRINE:  No heat or cold intolerance, polyuria or polydipsia.  HEMATOLOGICAL:  No easy bruising or bleeding.     Physical Exam:  Vital Signs Last 24 Hrs  T(C): 36.6 (2021 12:41), Max: 36.9 (2021 04:40)  T(F): 97.9 (2021 12:41), Max: 98.4 (2021 04:40)  HR: 65 (2021 12:41) (63 - 71)  BP: 142/87 (2021 12:41) (136/70 - 168/78)  BP(mean): --  RR: 18 (2021 12:41) (18 - 18)  SpO2: 95% (2021 12:41) (95% - 95%)  GEN: NAD  HEENT: normocephalic and atraumatic. EOMI. PERRL.    NECK: Supple.  No lymphadenopathy   LUNGS: Clear to auscultation.  HEART: Regular rate and rhythm without murmur.  ABDOMEN: Soft, tender, dressing on surgical site, drain with serosanguinous fluid   : No CVA tenderness  EXTREMITIES: Without any cyanosis, clubbing, rash, lesions or edema.  NEUROLOGIC: grossly intact.  PSYCHIATRIC: Appropriate affect .  SKIN: No ulceration or induration present.    Labs:                        10.3   15.37 )-----------( 408      ( 15 Virgil 2021 06:38 )             30.9     06-15    138  |  105  |  5<L>  ----------------------------<  114<H>  3.8   |  26  |  0.50    Ca    7.9<L>      15 Virgil 2021 06:38  Phos  2.8     06-15  Mg     2.3     06-15    TPro  6.1  /  Alb  2.0<L>  /  TBili  0.7  /  DBili  x   /  AST  22  /  ALT  35  /  AlkPhos  155<H>  06-15    Culture - Body Fluid with Gram Stain (collected 21 @ 00:27)  Source: .Body Fluid Peritoneal Fluid  Gram Stain (21 @ 04:51):    polymorphonuclear leukocytes seen    No organisms seen    by cytocentrifuge  Organism: Streptococcus intermedius (21 @ 18:12)    Sensitivities:      -  Ceftriaxone: S 0.125      -  Penicillin: S 0.023      Method Type: ETEST  Organism: Escherichia coli  Streptococcus intermedius (21 @ 18:11)  Organism: Streptococcus intermedius (21 @ 18:11)    Sensitivities:      -  Clindamycin: S      -  Erythromycin: S      -  Levofloxacin: S      -  Vancomycin: S      Method Type: KB  Organism: Escherichia coli (21 @ 17:40)    Sensitivities:      -  Amikacin: S <=16      -  Amoxicillin/Clavulanic Acid: S <=8/4      -  Ampicillin: R >16 These ampicillin results predict results for amoxicillin      -  Ampicillin/Sulbactam: S 8/4 Enterobacter, Citrobacter, and Serratia may develop resistance during prolonged therapy (3-4 days)      -  Aztreonam: S <=4      -  Cefazolin: S <=2 Enterobacter, Citrobacter, and Serratia may develop resistance during prolonged therapy (3-4 days)      -  Cefepime: S <=2      -  Cefoxitin: S <=8      -  Ceftriaxone: S <=1 Enterobacter, Citrobacter, and Serratia may develop resistance during prolonged therapy      -  Ciprofloxacin: S <=0.25      -  Ertapenem: S <=0.5      -  Gentamicin: S <=2      -  Imipenem: S <=1      -  Levofloxacin: S <=0.5      -  Meropenem: S <=1      -  Piperacillin/Tazobactam: S <=8      -  Tobramycin: S <=2      -  Trimethoprim/Sulfamethoxazole: R >      Method Type: TRINY    WBC Count: 15.37 K/uL (06-15-21 @ 06:38)  WBC Count: 13.17 K/uL (21 @ 07:03)  WBC Count: 16.75 K/uL (21 @ 08:35)  WBC Count: 20.61 K/uL (21 @ 07:34)  WBC Count: 16.33 K/uL (21 @ 07:04)  WBC Count: 19.75 K/uL (06-10-21 @ 15:54)    Creatinine, Serum: 0.50 mg/dL (06-15-21 @ 06:38)  Creatinine, Serum: 0.45 mg/dL (21 @ 07:03)  Creatinine, Serum: 0.51 mg/dL (21 @ 08:35)  Creatinine, Serum: 0.61 mg/dL (21 @ 07:34)  Creatinine, Serum: 0.53 mg/dL (21 @ 07:04)  Creatinine, Serum: 0.69 mg/dL (06-10-21 @ 15:54)    COVID-19 PCR: NotDetec (06-10-21 @ 16:00)    All imaging and other data have been reviewed.    Assessment and Plan:   62 yo woman with no significant PMH was admitted with abdominal pain and had fever, nausea and vomiting but no diarrhea. Reportedly imaginag showed perforated Appendicitis with RLQ abscess, so went to OR on 6/10 night and had appendectomy.  No fever but leukocytosis is 20k which could be normal reaction after surgery.   Abscess culture with Ecoli which is expected, the most common bacteria in GI tract.     1- perforated appendicitis  - Fluid culture from OR with a pansensitive ecoli  - WBC 20k-->16k -->13k-->15k went up today.   - On regular diet, had BM  - Continue Zosyn 3.375gm q8h   - Can be switched to Vantin 200mg q12 and Metronidazole 500mg q5h tomorrow and discharge.     Will follow PRN.    Emelyn Benedict MD  Division of Infectious Diseases   Cell [phone] between 8am and 6pm   After 6pm and weekends please call ID service at [phone].         
[facility]  INFECTIOUS DISEASES   [address] [place] [address]  Tel: [phone]     Fax: [phone]  =======================================================    [id number]  EDD BUSH     Follow up; Perforated appendicitis    Pain is better,  No nausea or vomiting. Tolerating diet.   Still has the drain but with less out put. No fever.     PAST MEDICAL & SURGICAL HISTORY:  No pertinent past medical history  H/O  section    Social Hx: no smoking, ETOH or drugs     FAMILY HISTORY: noncontributory     Allergies  No Known Allergies    Antibiotics:  piperacillin/tazobactam IVPB.. 3.375 Gram(s) IV Intermittent every 8 hours    REVIEW OF SYSTEMS:  CONSTITUTIONAL:  No Fever or chills  HEENT:  No diplopia or blurred vision.  No sore throat or runny nose.  CARDIOVASCULAR:  No chest pain or SOB.  RESPIRATORY:  No cough, shortness of breath, PND or orthopnea.  GASTROINTESTINAL:  No nausea, vomiting or diarrhea. + abdominal pain and bloating   GENITOURINARY:  No dysuria, frequency or urgency. No Blood in urine  MUSCULOSKELETAL:  no joint aches, no muscle pain  SKIN:  No change in skin, hair or nails.  NEUROLOGIC:  No paresthesias, fasciculations, seizures or weakness.  PSYCHIATRIC:  No disorder of thought or mood.  ENDOCRINE:  No heat or cold intolerance, polyuria or polydipsia.  HEMATOLOGICAL:  No easy bruising or bleeding.     Physical Exam:  Vital Signs Last 24 Hrs  T(C): 36.6 (2021 12:41), Max: 36.9 (2021 04:40)  T(F): 97.9 (2021 12:41), Max: 98.4 (2021 04:40)  HR: 65 (2021 12:41) (63 - 71)  BP: 142/87 (2021 12:41) (136/70 - 168/78)  BP(mean): --  RR: 18 (2021 12:41) (18 - 18)  SpO2: 95% (2021 12:41) (95% - 95%)  GEN: NAD  HEENT: normocephalic and atraumatic. EOMI. PERRL.    NECK: Supple.  No lymphadenopathy   LUNGS: Clear to auscultation.  HEART: Regular rate and rhythm without murmur.  ABDOMEN: Soft, tender, dressing on surgical site, drain with serosanguinous fluid   : No CVA tenderness  EXTREMITIES: Without any cyanosis, clubbing, rash, lesions or edema.  NEUROLOGIC: grossly intact.  PSYCHIATRIC: Appropriate affect .  SKIN: No ulceration or induration present.    Labs:                        10.0   13.17 )-----------( 364      ( 2021 07:03 )             29.6     06-14    140  |  105  |  9   ----------------------------<  104<H>  3.1<L>   |  26  |  0.45<L>    Ca    7.8<L>      2021 07:03    Culture - Body Fluid with Gram Stain (collected 21 @ 00:27)  Source: .Body Fluid Peritoneal Fluid  Gram Stain (21 @ 04:51):    polymorphonuclear leukocytes seen    No organisms seen    by cytocentrifuge  Organism: Escherichia coli (21 @ 17:40)  Organism: Escherichia coli (21 @ 17:40)    Sensitivities:      -  Amikacin: S <=16      -  Amoxicillin/Clavulanic Acid: S <=8/4      -  Ampicillin: R >16 These ampicillin results predict results for amoxicillin      -  Ampicillin/Sulbactam: S 8/4 Enterobacter, Citrobacter, and Serratia may develop resistance during prolonged therapy (3-4 days)      -  Aztreonam: S <=4      -  Cefazolin: S <=2 Enterobacter, Citrobacter, and Serratia may develop resistance during prolonged therapy (3-4 days)      -  Cefepime: S <=2      -  Cefoxitin: S <=8      -  Ceftriaxone: S <=1 Enterobacter, Citrobacter, and Serratia may develop resistance during prolonged therapy      -  Ciprofloxacin: S <=0.25      -  Ertapenem: S <=0.5      -  Gentamicin: S <=2      -  Imipenem: S <=1      -  Levofloxacin: S <=0.5      -  Meropenem: S <=1      -  Piperacillin/Tazobactam: S <=8      -  Tobramycin: S <=2      -  Trimethoprim/Sulfamethoxazole: R >/      Method Type: TRINY    WBC Count: 13.17 K/uL (21 @ 07:03)  WBC Count: 16.75 K/uL (21 @ 08:35)  WBC Count: 20.61 K/uL (21 @ 07:34)  WBC Count: 16.33 K/uL (21 @ 07:04)  WBC Count: 19.75 K/uL (06-10-21 @ 15:54)    Creatinine, Serum: 0.45 mg/dL (21 @ 07:03)  Creatinine, Serum: 0.51 mg/dL (21 @ 08:35)  Creatinine, Serum: 0.61 mg/dL (21 @ 07:34)  Creatinine, Serum: 0.53 mg/dL (21 @ 07:04)  Creatinine, Serum: 0.69 mg/dL (06-10-21 @ 15:54)     COVID-19 PCR: NotDetec (06-10-21 @ 16:00)    All imaging and other data have been reviewed.    Assessment and Plan:   60 yo woman with no significant PMH was admitted with abdominal pain and had fever, nausea and vomiting but no diarrhea. Reportedly imaginag showed perforated Appendicitis with RLQ abscess, so went to OR on 6/10 night and had appendectomy.  No fever but leukocytosis is 20k which could be normal reaction after surgery.   Abscess culture with Ecoli which is expected, the most common bacteria in GI tract.     1- perforated appendicitis  - Fluid culture from OR with a pansensitive ecoli  - WBC 20k-->16k -->13k trending down  - Advance diet as per surgery.   - Continue Zosyn 3.375gm q8h but as soon as tolerates regular diet can switch to oral.  - Can be switched to Vantin 200mg q12 and Metronidazole 500mg q5h when ready for discharge.     Will follow.    Emelyn Benedict MD  Division of Infectious Diseases   Cell [phone] between 8am and 6pm   After 6pm and weekends please call ID service at [phone].     
STATUS POST:  lap appy with PIPE drain placement for perforated appendicitis    POST OPERATIVE DAY #: 2    SUBJECTIVE:  Patient seen and examined at bedside.  No overnight events.  Patient with no new complaints at this time, currently on sips and chips, denies flatus and bowel movement.  Patient denies any fevers, chills, chest pain, shortness of breath, abdominal pain, nausea, vomiting or diarrhea.    Vital Signs Last 24 Hrs  T(C): 36.9 (13 Jun 2021 04:56), Max: 36.9 (13 Jun 2021 04:56)  T(F): 98.4 (13 Jun 2021 04:56), Max: 98.4 (13 Jun 2021 04:56)  HR: 76 (13 Jun 2021 04:56) (74 - 76)  BP: 138/77 (13 Jun 2021 04:56) (138/77 - 160/80)  BP(mean): --  RR: 17 (13 Jun 2021 04:56) (17 - 17)  SpO2: 97% (13 Jun 2021 04:56) (94% - 97%)    PHYSICAL EXAM:  GENERAL: No acute distress, well-developed  HEAD:  Atraumatic, Normocephalic  ABDOMEN: Soft, mildly-tender, mildly-distended; bowel sounds+  NEUROLOGY: A&O x 3, no focal deficits    I&O's Summary    11 Jun 2021 07:01  -  12 Jun 2021 07:00  --------------------------------------------------------  IN: 2550 mL / OUT: 3180 mL / NET: -630 mL    12 Jun 2021 07:01  -  13 Jun 2021 05:46  --------------------------------------------------------  IN: 170 mL / OUT: 1385 mL / NET: -1215 mL      I&O's Detail    11 Jun 2021 07:01  -  12 Jun 2021 07:00  --------------------------------------------------------  IN:    IV PiggyBack: 25 mL    IV PiggyBack: 100 mL    Lactated Ringers: 800 mL    Oral Fluid: 500 mL    sodium chloride 0.9%: 1125 mL  Total IN: 2550 mL    OUT:    Bulb (mL): 380 mL    Indwelling Catheter - Urethral (mL): 400 mL    Voided (mL): 2400 mL  Total OUT: 3180 mL    Total NET: -630 mL      12 Jun 2021 07:01  -  13 Jun 2021 05:46  --------------------------------------------------------  IN:    IV PiggyBack: 170 mL  Total IN: 170 mL    OUT:    Bulb (mL): 560 mL    Voided (mL): 825 mL  Total OUT: 1385 mL    Total NET: -1215 mL        MEDICATIONS  (STANDING):  enoxaparin Injectable 40 milliGRAM(s) SubCutaneous daily  pantoprazole  Injectable 40 milliGRAM(s) IV Push daily  piperacillin/tazobactam IVPB.. 3.375 Gram(s) IV Intermittent every 8 hours  sodium chloride 0.9%. 1000 milliLiter(s) (75 mL/Hr) IV Continuous <Continuous>    MEDICATIONS  (PRN):  aluminum hydroxide/magnesium hydroxide/simethicone Suspension 30 milliLiter(s) Oral every 4 hours PRN Dyspepsia  HYDROmorphone  Injectable 1 milliGRAM(s) IV Push every 4 hours PRN Severe Pain (7 - 10)  HYDROmorphone  Injectable 0.5 milliGRAM(s) IV Push every 4 hours PRN Moderate Pain (4 - 6)  ondansetron Injectable 4 milliGRAM(s) IV Push every 6 hours PRN Nausea and/or Vomiting    LABS:                        12.3   20.61 )-----------( 391      ( 12 Jun 2021 07:34 )             37.2     06-12    142  |  109<H>  |  11  ----------------------------<  160<H>  3.5   |  26  |  0.61    Ca    8.1<L>      12 Jun 2021 07:34    TPro  6.4  /  Alb  2.1<L>  /  TBili  2.1<H>  /  DBili  x   /  AST  27  /  ALT  78  /  AlkPhos  150<H>  06-11        RADIOLOGY & ADDITIONAL STUDIES:  no new
    Subjective: pt improved, tolerating po diet    Objective:  Vital Signs Last 24 Hrs  T(C): 36.9 (15 Virgil 2021 05:35), Max: 36.9 (15 Virgil 2021 05:35)  T(F): 98.5 (15 Virgil 2021 05:35), Max: 98.5 (15 Virgil 2021 05:35)  HR: 60 (15 Virgil 2021 05:35) (60 - 65)  BP: 123/77 (15 Virgil 2021 05:35) (123/77 - 142/87)  BP(mean): --  RR: 18 (15 Virgil 2021 05:35) (18 - 18)  SpO2: 97% (15 Virgil 2021 05:35) (95% - 97%)    Heent: BRIONNAEVIN  Pul: clear  Cor: RSR, without murmurs  Abdomen: normal bowel sounds, without distension or tenderness  PIPE with serous out put  Extremities: without edema, motor/sensory intact, without calf pain, Homans sign negative.                        10.3   15.37 )-----------( 408      ( 15 Virgil 2021 06:38 )             30.9       06-15    138  |  105  |  5<L>  ----------------------------<  114<H>  3.8   |  26  |  0.50    Ca    7.9<L>      15 Virgil 2021 06:38  Phos  2.8     06-15  Mg     2.3     06-15    TPro  6.1  /  Alb  2.0<L>  /  TBili  0.7  /  DBili  x   /  AST  22  /  ALT  35  /  AlkPhos  155<H>  06-15        06-14 @ 07:01  -  06-15 @ 07:00  --------------------------------------------------------  IN:    dextrose 5% + sodium chloride 0.45% w/ Additives: 750 mL    IV PiggyBack: 125 mL    Oral Fluid: 960 mL    sodium chloride 0.9%: 70 mL  Total IN: 1905 mL    OUT:    Bulb (mL): 170 mL    Voided (mL): 1550 mL  Total OUT: 1720 mL    Total NET: 185 mL          
SURGERY PA NOTE ON BEHALF OF DR. HASSAN (COVERING FOR DR. THOMAS) / GENERAL SURGERY:    S: Patient seen and examined at bedside.  Reports persistent abdominal bloating and reflux symptoms after starting CLD, particularly after Ensure.  Had 2 episodes of vomiting overnight (very small amount, tasted of Ensure).  Otherwise, no overnight events noted.  Has been ambulating.  No flatus or bm as of yet.  Pedro d/c'd, has been voiding without issue.  Denies f/c/d, denies CP/SOB/MCDERMOTT/palpitations/dizziness/lightheadedness.      MEDICATIONS:  enoxaparin Injectable 40 milliGRAM(s) SubCutaneous daily  HYDROmorphone  Injectable 1 milliGRAM(s) IV Push every 4 hours PRN  HYDROmorphone  Injectable 0.5 milliGRAM(s) IV Push every 4 hours PRN  ondansetron Injectable 4 milliGRAM(s) IV Push every 6 hours PRN  pantoprazole  Injectable 40 milliGRAM(s) IV Push daily  piperacillin/tazobactam IVPB.. 3.375 Gram(s) IV Intermittent every 8 hours  sodium chloride 0.9%. 1000 milliLiter(s) IV Continuous <Continuous>      O:  Vital Signs Last 24 Hrs  T(C): 36.5 (12 Jun 2021 05:43), Max: 36.9 (11 Jun 2021 20:32)  T(F): 97.7 (12 Jun 2021 05:43), Max: 98.4 (11 Jun 2021 20:32)  HR: 74 (12 Jun 2021 05:43) (72 - 75)  BP: 157/82 (12 Jun 2021 05:43) (130/81 - 157/82)  BP(mean): --  RR: 18 (12 Jun 2021 05:43) (18 - 18)  SpO2: 95% (12 Jun 2021 05:43) (95% - 97%)    I&O SUMMARY:    06-11-21 @ 07:01  -  06-12-21 @ 07:00  --------------------------------------------------------  IN: 2550 mL / OUT: 3180 mL / NET: -630 mL    06-12-21 @ 07:01  -  06-12-21 @ 14:01  --------------------------------------------------------  IN: 170 mL / OUT: 370 mL / NET: -200 mL        PHYSICAL EXAM:  Lungs: CTA bilat without W/R/R  Card: S1S2  Abd: Moderate distention, with jacob-incisional soreness and diffuse, mild tenderness.  +Tympani throughout.  Dressings all c/d/i.  J-P drain with serous fluid - no blood, no purulence, no bile.  +BS x 4.  No rebound/guarding.    Ext: Calves soft, NT, without edema bilat    LABS:                        12.3   20.61 )-----------( 391      ( 12 Jun 2021 07:34 )             37.2     06-12    142  |  109<H>  |  11  ----------------------------<  160<H>  3.5   |  26  |  0.61    Ca    8.1<L>      12 Jun 2021 07:34    TPro  6.4  /  Alb  2.1<L>  /  TBili  2.1<H>  /  DBili  x   /  AST  27  /  ALT  78  /  AlkPhos  150<H>  06-11    PT/INR - ( 10 Virgil 2021 15:59 )   PT: 15.1 sec;   INR: 1.31 ratio         PTT - ( 10 Virgil 2021 15:59 )  PTT:35.2 sec

## 2021-06-17 NOTE — PROGRESS NOTE ADULT - REASON FOR ADMISSION
abdominal pain
s/p perforated appendicitis
abdominal pain
abdominal pain
perforated appendicitis
s/p laparoscopic appendectomy- perforated appendix
s/p perforated appendectomy
abdominal pain

## 2021-06-17 NOTE — PROGRESS NOTE ADULT - PROVIDER SPECIALTY LIST ADULT
Surgery
Anesthesia
Infectious Disease
Surgery
Surgery
Infectious Disease
Surgery
Infectious Disease
Surgery

## 2021-06-18 PROBLEM — Z78.9 OTHER SPECIFIED HEALTH STATUS: Chronic | Status: ACTIVE | Noted: 2021-06-10

## 2021-06-24 ENCOUNTER — APPOINTMENT (OUTPATIENT)
Dept: SURGERY | Facility: CLINIC | Age: 61
End: 2021-06-24
Payer: COMMERCIAL

## 2021-06-24 ENCOUNTER — NON-APPOINTMENT (OUTPATIENT)
Age: 61
End: 2021-06-24

## 2021-06-24 VITALS
BODY MASS INDEX: 26.9 KG/M2 | WEIGHT: 137 LBS | HEIGHT: 60 IN | SYSTOLIC BLOOD PRESSURE: 106 MMHG | OXYGEN SATURATION: 97 % | HEART RATE: 60 BPM | DIASTOLIC BLOOD PRESSURE: 66 MMHG

## 2021-06-24 VITALS — TEMPERATURE: 97.4 F

## 2021-06-24 DIAGNOSIS — Z78.9 OTHER SPECIFIED HEALTH STATUS: ICD-10-CM

## 2021-06-24 DIAGNOSIS — Z87.891 PERSONAL HISTORY OF NICOTINE DEPENDENCE: ICD-10-CM

## 2021-06-24 DIAGNOSIS — Z80.0 FAMILY HISTORY OF MALIGNANT NEOPLASM OF DIGESTIVE ORGANS: ICD-10-CM

## 2021-06-24 DIAGNOSIS — K35.32 ACUTE APPENDICITIS W/ PERFORATION AND LOCALIZED PERITONITIS, W/O ABSCESS: ICD-10-CM

## 2021-06-24 DIAGNOSIS — Z82.49 FAMILY HISTORY OF ISCHEMIC HEART DISEASE AND OTHER DISEASES OF THE CIRCULATORY SYSTEM: ICD-10-CM

## 2021-06-24 DIAGNOSIS — Z84.1 FAMILY HISTORY OF DISORDERS OF KIDNEY AND URETER: ICD-10-CM

## 2021-06-24 PROCEDURE — 99024 POSTOP FOLLOW-UP VISIT: CPT

## 2021-06-24 NOTE — HISTORY OF PRESENT ILLNESS
[de-identified] : s/p laparoscopic appendectomy for a perforated appendicitis with peritonitis 6/10/21 [de-identified] : Pt had a ct scan on the day of discharge that showed fluid collections and as per my discussion with her I am arranging a follow up ct scan now. She is finishing the oral antibiotics. She denies any fevers or chills. Normal GI functioning

## 2021-06-24 NOTE — PHYSICAL EXAM
[Normal Breath Sounds] : Normal breath sounds [Normal Heart Sounds] : normal heart sounds [Normal Rate and Rhythm] : normal rate and rhythm [Calm] : calm [de-identified] : well developed female in no distress [de-identified] : without adenopathy [de-identified] : normal bowel sounds, without distension or tenderness\par Incisions clean and closed [de-identified] : without calf pain or swelling

## 2021-07-01 ENCOUNTER — RESULT REVIEW (OUTPATIENT)
Age: 61
End: 2021-07-01

## 2021-07-01 ENCOUNTER — OUTPATIENT (OUTPATIENT)
Dept: OUTPATIENT SERVICES | Facility: HOSPITAL | Age: 61
LOS: 1 days | End: 2021-07-01
Payer: COMMERCIAL

## 2021-07-01 DIAGNOSIS — Z98.891 HISTORY OF UTERINE SCAR FROM PREVIOUS SURGERY: Chronic | ICD-10-CM

## 2021-07-01 DIAGNOSIS — K65.1 PERITONEAL ABSCESS: ICD-10-CM

## 2021-07-01 PROCEDURE — 74177 CT ABD & PELVIS W/CONTRAST: CPT

## 2021-07-01 PROCEDURE — 74177 CT ABD & PELVIS W/CONTRAST: CPT | Mod: 26

## 2021-07-14 ENCOUNTER — TRANSCRIPTION ENCOUNTER (OUTPATIENT)
Age: 61
End: 2021-07-14

## 2021-07-30 NOTE — H&P ADULT - NSCORESITESY/N_GEN_A_CORE_RD
Yes Rituxan Pregnancy And Lactation Text: This medication is Pregnancy Category C and it isn't know if it is safe during pregnancy. It is unknown if this medication is excreted in breast milk but similar antibodies are known to be excreted.

## 2023-10-17 NOTE — DIETITIAN INITIAL EVALUATION ADULT. - ENTER TO (CAL/KG)
Patient said that she talked with Shu about a referral for pain management.  She did check to find someone in her network.    She found Dr. Gerardo Negron in [place], [place]    She would now like that referral.   25
